# Patient Record
Sex: MALE | Race: WHITE | Employment: FULL TIME | URBAN - METROPOLITAN AREA
[De-identification: names, ages, dates, MRNs, and addresses within clinical notes are randomized per-mention and may not be internally consistent; named-entity substitution may affect disease eponyms.]

---

## 2021-02-22 ENCOUNTER — IMMUNIZATION (OUTPATIENT)
Dept: PRIMARY CARE CLINIC | Age: 66
End: 2021-02-22
Payer: MEDICARE

## 2021-02-22 PROCEDURE — 0001A COVID-19, PFIZER VACCINE 30MCG/0.3ML DOSE: CPT | Performed by: NURSE PRACTITIONER

## 2021-02-22 PROCEDURE — 91300 COVID-19, PFIZER VACCINE 30MCG/0.3ML DOSE: CPT | Performed by: NURSE PRACTITIONER

## 2021-03-16 ENCOUNTER — IMMUNIZATION (OUTPATIENT)
Dept: PRIMARY CARE CLINIC | Age: 66
End: 2021-03-16
Payer: MEDICARE

## 2021-03-16 PROCEDURE — 91300 COVID-19, PFIZER VACCINE 30MCG/0.3ML DOSE: CPT | Performed by: NURSE PRACTITIONER

## 2021-03-16 PROCEDURE — 0002A COVID-19, PFIZER VACCINE 30MCG/0.3ML DOSE: CPT | Performed by: NURSE PRACTITIONER

## 2022-04-20 ENCOUNTER — HOSPITAL ENCOUNTER (EMERGENCY)
Age: 67
Discharge: HOME | End: 2022-04-20
Attending: EMERGENCY MEDICINE
Payer: MEDICARE

## 2022-04-20 ENCOUNTER — APPOINTMENT (OUTPATIENT)
Dept: GENERAL RADIOLOGY | Age: 67
End: 2022-04-20
Payer: MEDICARE

## 2022-04-20 ENCOUNTER — APPOINTMENT (OUTPATIENT)
Dept: CT IMAGING | Age: 67
End: 2022-04-20
Payer: MEDICARE

## 2022-04-20 ENCOUNTER — APPOINTMENT (OUTPATIENT)
Dept: ULTRASOUND IMAGING | Age: 67
End: 2022-04-20
Payer: MEDICARE

## 2022-04-20 VITALS
TEMPERATURE: 97.6 F | HEART RATE: 82 BPM | OXYGEN SATURATION: 97 % | BODY MASS INDEX: 24.11 KG/M2 | HEIGHT: 72 IN | SYSTOLIC BLOOD PRESSURE: 119 MMHG | WEIGHT: 178 LBS | RESPIRATION RATE: 18 BRPM | DIASTOLIC BLOOD PRESSURE: 90 MMHG

## 2022-04-20 DIAGNOSIS — I63.9 CEREBROVASCULAR ACCIDENT (CVA), UNSPECIFIED MECHANISM (HCC): Primary | ICD-10-CM

## 2022-04-20 LAB
ANION GAP SERPL CALCULATED.3IONS-SCNC: 11 MMOL/L (ref 7–16)
BUN BLDV-MCNC: 22 MG/DL (ref 6–23)
CALCIUM SERPL-MCNC: 10.6 MG/DL (ref 8.6–10.2)
CHLORIDE BLD-SCNC: 99 MMOL/L (ref 98–107)
CO2: 24 MMOL/L (ref 22–29)
CREAT SERPL-MCNC: 0.9 MG/DL (ref 0.7–1.2)
GFR AFRICAN AMERICAN: >60
GFR NON-AFRICAN AMERICAN: >60 ML/MIN/1.73
GLUCOSE BLD-MCNC: 244 MG/DL (ref 74–99)
HCT VFR BLD CALC: 49.7 % (ref 37–54)
HEMOGLOBIN: 16.8 G/DL (ref 12.5–16.5)
MCH RBC QN AUTO: 29.3 PG (ref 26–35)
MCHC RBC AUTO-ENTMCNC: 33.8 % (ref 32–34.5)
MCV RBC AUTO: 86.6 FL (ref 80–99.9)
PDW BLD-RTO: 12.6 FL (ref 11.5–15)
PLATELET # BLD: 245 E9/L (ref 130–450)
PMV BLD AUTO: 12 FL (ref 7–12)
POTASSIUM SERPL-SCNC: 4.5 MMOL/L (ref 3.5–5)
RBC # BLD: 5.74 E12/L (ref 3.8–5.8)
SODIUM BLD-SCNC: 134 MMOL/L (ref 132–146)
TROPONIN, HIGH SENSITIVITY: 24 NG/L (ref 0–11)
TROPONIN, HIGH SENSITIVITY: 26 NG/L (ref 0–11)
WBC # BLD: 6.7 E9/L (ref 4.5–11.5)

## 2022-04-20 PROCEDURE — 70450 CT HEAD/BRAIN W/O DYE: CPT

## 2022-04-20 PROCEDURE — 80048 BASIC METABOLIC PNL TOTAL CA: CPT

## 2022-04-20 PROCEDURE — 99285 EMERGENCY DEPT VISIT HI MDM: CPT

## 2022-04-20 PROCEDURE — 71045 X-RAY EXAM CHEST 1 VIEW: CPT

## 2022-04-20 PROCEDURE — 85027 COMPLETE CBC AUTOMATED: CPT

## 2022-04-20 PROCEDURE — 84484 ASSAY OF TROPONIN QUANT: CPT

## 2022-04-20 PROCEDURE — 93005 ELECTROCARDIOGRAM TRACING: CPT | Performed by: EMERGENCY MEDICINE

## 2022-04-20 PROCEDURE — 93880 EXTRACRANIAL BILAT STUDY: CPT

## 2022-04-20 RX ORDER — INSULIN GLARGINE 300 U/ML
45 INJECTION, SOLUTION SUBCUTANEOUS DAILY
COMMUNITY

## 2022-04-20 RX ORDER — LISINOPRIL 10 MG/1
10 TABLET ORAL DAILY
COMMUNITY

## 2022-04-20 RX ORDER — ASPIRIN 81 MG/1
81 TABLET ORAL DAILY
COMMUNITY

## 2022-04-20 RX ORDER — METOPROLOL SUCCINATE 50 MG/1
50 TABLET, EXTENDED RELEASE ORAL DAILY
COMMUNITY

## 2022-04-20 RX ORDER — ATORVASTATIN CALCIUM 80 MG/1
80 TABLET, FILM COATED ORAL DAILY
COMMUNITY

## 2022-04-20 RX ORDER — ERGOCALCIFEROL 1.25 MG/1
50000 CAPSULE ORAL WEEKLY
COMMUNITY

## 2022-04-20 RX ORDER — METFORMIN HYDROCHLORIDE 500 MG/1
500 TABLET, EXTENDED RELEASE ORAL 2 TIMES DAILY
COMMUNITY

## 2022-04-20 RX ORDER — LANOLIN ALCOHOL/MO/W.PET/CERES
1000 CREAM (GRAM) TOPICAL DAILY
COMMUNITY

## 2022-04-20 RX ORDER — SERTRALINE HYDROCHLORIDE 100 MG/1
100 TABLET, FILM COATED ORAL DAILY
COMMUNITY

## 2022-04-20 ASSESSMENT — ENCOUNTER SYMPTOMS
NAUSEA: 0
TROUBLE SWALLOWING: 1
VISUAL CHANGE: 0

## 2022-04-20 NOTE — ED PROVIDER NOTES
Patient presents with stroke symptoms that he awoke with on Monday 4/18/22. He states he has difficulty swallowing, left sided facial droop, slurred speech and imbalance with walking. He had temperature sensation change on the left upper extremity that has since resolved. He states his strength has been intake. He takes a daily aspirin due to cardiac bypass. The history is provided by the patient. Cerebrovascular Accident  Presenting symptoms: language symptoms, loss of balance and sensory loss    Presenting symptoms: no change in level of consciousness, no confusion, no headaches, no disturbances in coordination, no visual change and no weakness    Onset quality:  Unable to specify  Last known well:  4/17/22  Timing:  Constant  Progression:  Improving  Similar to previous episodes: no    Associated symptoms: difficulty swallowing and dizziness    Associated symptoms: no chest pain, no facial pain, no fever, no hearing loss, no nausea, no paresthesias and no vertigo        Review of Systems   Constitutional: Negative for fever. HENT: Positive for trouble swallowing. Negative for hearing loss. Cardiovascular: Negative for chest pain. Gastrointestinal: Negative for nausea. Musculoskeletal: Negative. Skin: Negative. Neurological: Positive for dizziness, facial asymmetry, speech difficulty and loss of balance. Negative for vertigo, weakness, light-headedness, headaches, disturbances in coordination and paresthesias. Psychiatric/Behavioral: Negative for confusion. Physical Exam  Vitals and nursing note reviewed. Constitutional:       General: He is not in acute distress. Appearance: Normal appearance. He is well-developed and normal weight. He is not ill-appearing, toxic-appearing or diaphoretic. HENT:      Head: Normocephalic and atraumatic.       Right Ear: Tympanic membrane, ear canal and external ear normal.      Left Ear: Tympanic membrane, ear canal and external ear normal. Nose: Nose normal.      Mouth/Throat:      Mouth: Mucous membranes are moist.      Pharynx: Oropharynx is clear. Eyes:      Extraocular Movements: Extraocular movements intact. Conjunctiva/sclera: Conjunctivae normal.      Pupils: Pupils are equal, round, and reactive to light. Cardiovascular:      Rate and Rhythm: Normal rate and regular rhythm. Pulses: Normal pulses. Heart sounds: Normal heart sounds. No murmur heard. Pulmonary:      Effort: Pulmonary effort is normal. No respiratory distress. Breath sounds: Normal breath sounds. No wheezing or rales. Abdominal:      General: Bowel sounds are normal.      Palpations: Abdomen is soft. Tenderness: There is no abdominal tenderness. There is no guarding or rebound. Musculoskeletal:         General: No swelling or tenderness. Normal range of motion. Cervical back: Normal range of motion and neck supple. No rigidity or tenderness. Right lower leg: No edema. Left lower leg: No edema. Lymphadenopathy:      Cervical: No cervical adenopathy. Skin:     General: Skin is warm and dry. Capillary Refill: Capillary refill takes less than 2 seconds. Coloration: Skin is not pale. Findings: No erythema or rash. Neurological:      Mental Status: He is alert and oriented to person, place, and time. Cranial Nerves: No cranial nerve deficit. Motor: Weakness (left lower facial) present. Coordination: Coordination normal.      Comments: Last known well time: 4/17/22  NIH Stroke Scale at time of initial evaluation:   1A: Level of Consciousness 0 - alert; keenly responsive  1B: Ask Month and Age 0 - answers both questions correctly  1C:  Tell Patient To Open and Close Eyes, then Hand  Squeeze 0 - performs both tasks correctly  2: Test Horizontal Extraocular Movements 0 - normal  3: Test Visual Fields 0 - no visual loss  4: Test Facial Palsy 2 - partial paralysis (total or near total paralysis of the lower face)  5A: Test Left Arm Motor Drift 0 - no drift, limb holds 90 (or 45) degrees for full 10 seconds  5B: Test Right Arm Motor Drift 0 - no drift, limb holds 90 (or 45) degrees for full 10 seconds  6A: Test Left Leg Motor Drift 0 - no drift; leg holds 30 degree position for full 5 seconds  6B: Test Right Leg Motor Drift 0 - no drift; leg holds 30 degree position for full 5 seconds  7: Test Limb Ataxia   (FNF/Heel-Shin) 0 - absent  8: Test Sensation 0 - normal; no sensory loss  9: Test Language/Aphasia 1 - mild to moderate aphasia; some obvious loss of fluency or facility of comprehension without significant limitation on ideas expressed or form of expression. Reduction of speech and/or comprehension, however, makes conversation about provided materials difficult or impossible. For example, in conversation about provided materials, examiner can identify picture or naming card content from patient's response. 10: Test Dysarthria 0 - normal  11: Test Extinction/Inattention 0 - no abnormality  Total NIH Stroke Score: 4    tPA Criteria*  Inclusion criteria:  - Ischemic stroke onset within 3 hours of drug administration  - Age 25 or older  - No hemorrhage or non-stroke cause of deficit on CT  - Measurable deficit on NIH Stroke Scale    Exclusion criteria: If the patient. ...  - has minor or improving symptoms  - had seizure at onset of stroke  - has had another stroke or serious head trauma within the last 3 months  - has had major surgery within the last 14 days  - has known history of intracranial hemorrhage  - has sustained systolic blood pressure >967 mmHg  - has sustained diastolic blood pressure >074 mmHg  - requires aggressive treatment is necessary to lower their blood pressure  - has symptoms suggestive of subarachnoid hemorrhage  - has had GI or urinary tract hemorrhage within the last 21 days  - has had an arterial puncture at a non-compressible site within the last 7 days   - received heparin within the last 48 hours and has an elevated PTT  - has a prothrombin time (PT) >15 seconds  - has a platelet count <668,754 uL  - serum blood glucose is <50 mg/dL or >400 mg/dL    Relative Contraindications:  - NIH Stroke score >22  - Patient's CT shows evidence of large MCA territory infarction (>1/3 the MCA territory)    *[facility] Policy Paper      Acute CVA Core Measures:     - t-PA Eligibility: IV t-PA was considered and not given due to violations in inclusion criteria including stroke onset was greater than 3 hours prior to presentation        Psychiatric:         Mood and Affect: Mood normal.         Behavior: Behavior normal.         Thought Content: Thought content normal.         Judgment: Judgment normal.         Procedures    MDM       EKG Interpretation    Interpreted by emergency department physician    Rhythm: normal sinus   Rate: normal  Axis: normal  Ectopy: none  Conduction: normal  ST Segments: nonspecific changes  T Waves: inversion in  v5 and III  Q Waves: nonspecific    Clinical Impression: non-specific EKG    Cecilia Bliss DO       --------------------------------------------- PAST HISTORY ---------------------------------------------  Past Medical History:  has no past medical history on file. Past Surgical History:  has no past surgical history on file. Social History:      Family History: family history is not on file. The patients home medications have been reviewed. Allergies: Patient has no known allergies.     -------------------------------------------------- RESULTS -------------------------------------------------  Labs:  Results for orders placed or performed during the hospital encounter of 51/74/76   Basic metabolic panel   Result Value Ref Range    Sodium 134 132 - 146 mmol/L    Potassium 4.5 3.5 - 5.0 mmol/L    Chloride 99 98 - 107 mmol/L    CO2 24 22 - 29 mmol/L    Anion Gap 11 7 - 16 mmol/L    Glucose 244 (H) 74 - 99 mg/dL    BUN 22 6 - 23 mg/dL CREATININE 0.9 0.7 - 1.2 mg/dL    GFR Non-African American >60 >=60 mL/min/1.73    GFR African American >60     Calcium 10.6 (H) 8.6 - 10.2 mg/dL   CBC   Result Value Ref Range    WBC 6.7 4.5 - 11.5 E9/L    RBC 5.74 3.80 - 5.80 E12/L    Hemoglobin 16.8 (H) 12.5 - 16.5 g/dL    Hematocrit 49.7 37.0 - 54.0 %    MCV 86.6 80.0 - 99.9 fL    MCH 29.3 26.0 - 35.0 pg    MCHC 33.8 32.0 - 34.5 %    RDW 12.6 11.5 - 15.0 fL    Platelets 885 452 - 130 E9/L    MPV 12.0 7.0 - 12.0 fL   Troponin   Result Value Ref Range    Troponin, High Sensitivity 26 (H) 0 - 11 ng/L   Troponin   Result Value Ref Range    Troponin, High Sensitivity 24 (H) 0 - 11 ng/L   EKG 12 Lead   Result Value Ref Range    Ventricular Rate 86 BPM    Atrial Rate 86 BPM    P-R Interval 156 ms    QRS Duration 86 ms    Q-T Interval 324 ms    QTc Calculation (Bazett) 387 ms    P Axis 25 degrees    R Axis -4 degrees    T Axis -17 degrees       Radiology:  US CAROTID ARTERY BILATERAL   Final Result   The right internal carotid artery demonstrates 0-50% stenosis. The left internal carotid artery demonstrates 0-50% stenosis. Bilateral vertebral arteries are patent with flow in the normal direction. XR CHEST 1 VIEW   Final Result   Minimal scarring and or atelectasis on the left. Postoperative changes. CT HEAD WO CONTRAST   Final Result   No acute intracranial abnormality.             ------------------------- NURSING NOTES AND VITALS REVIEWED ---------------------------  Date / Time Roomed:  4/20/2022  8:37 AM  ED Bed Assignment:  03/03    The nursing notes within the ED encounter and vital signs as below have been reviewed.    /77   Pulse 92   Temp 97.6 °F (36.4 °C) (Infrared)   Resp 16   Ht 6' (1.829 m)   Wt 178 lb (80.7 kg)   SpO2 96%   BMI 24.14 kg/m²   Oxygen Saturation Interpretation: Normal      ------------------------------------------ PROGRESS NOTES ------------------------------------------  I have spoken with the patient and discussed todays results, in addition to providing specific details for the plan of care and counseling regarding the diagnosis and prognosis. Their questions are answered at this time and they are agreeable with the plan. I discussed at length with them reasons for immediate return here for re evaluation. They will followup with primary care by calling their office tomorrow. Patient is to continue taking aspirin daily and will be started on Plavix for the next month. A prescription was provided. His workup here does not require urgent admission. It has been 3 days since his symptom onset and his symptoms have been improving. He understands that he needs to promptly return if his symptoms worsen. He will call PCP to make an outpatient appointment. I too have placed a call to Dr Edgar Rodarte. I spoke to the admitting physician who agrees with the plan for further outpatient follow up.  --------------------------------- ADDITIONAL PROVIDER NOTES ---------------------------------  At this time the patient is without objective evidence of an acute process requiring hospitalization or inpatient management. They have remained hemodynamically stable throughout their entire ED visit and are stable for discharge with outpatient follow-up. The plan has been discussed in detail and they are aware of the specific conditions for emergent return, as well as the importance of follow-up. New Prescriptions    No medications on file       Diagnosis:  1. Cerebrovascular accident (CVA), unspecified mechanism ([facility]        Disposition:  Patient's disposition: Discharge to home  Patient's condition is stable.          [address], DO  04/20/22 1403

## 2022-04-23 LAB
EKG ATRIAL RATE: 86 BPM
EKG P AXIS: 25 DEGREES
EKG P-R INTERVAL: 156 MS
EKG Q-T INTERVAL: 324 MS
EKG QRS DURATION: 86 MS
EKG QTC CALCULATION (BAZETT): 387 MS
EKG R AXIS: -4 DEGREES
EKG T AXIS: -17 DEGREES
EKG VENTRICULAR RATE: 86 BPM

## 2022-12-26 ENCOUNTER — APPOINTMENT (OUTPATIENT)
Dept: ULTRASOUND IMAGING | Age: 67
DRG: 066 | End: 2022-12-26
Payer: MEDICARE

## 2022-12-26 ENCOUNTER — HOSPITAL ENCOUNTER (INPATIENT)
Age: 67
LOS: 3 days | Discharge: NURSING, REHAB OR LONG-TERM CARE | DRG: 066 | End: 2022-12-29
Attending: EMERGENCY MEDICINE | Admitting: INTERNAL MEDICINE
Payer: MEDICARE

## 2022-12-26 ENCOUNTER — APPOINTMENT (OUTPATIENT)
Dept: CT IMAGING | Age: 67
DRG: 066 | End: 2022-12-26
Payer: MEDICARE

## 2022-12-26 DIAGNOSIS — I63.9 CEREBROVASCULAR ACCIDENT (CVA), UNSPECIFIED MECHANISM (HCC): Primary | ICD-10-CM

## 2022-12-26 LAB
ANION GAP SERPL CALCULATED.3IONS-SCNC: 14 MMOL/L (ref 7–16)
APTT: 28.6 SEC (ref 24.5–35.1)
BACTERIA: ABNORMAL /HPF
BASOPHILS ABSOLUTE: 0.05 E9/L (ref 0–0.2)
BASOPHILS RELATIVE PERCENT: 0.4 % (ref 0–2)
BILIRUBIN URINE: NEGATIVE
BLOOD, URINE: NEGATIVE
BUN BLDV-MCNC: 17 MG/DL (ref 6–23)
CALCIUM SERPL-MCNC: 10.5 MG/DL (ref 8.6–10.2)
CHLORIDE BLD-SCNC: 101 MMOL/L (ref 98–107)
CHP ED QC CHECK: NORMAL
CLARITY: CLEAR
CO2: 22 MMOL/L (ref 22–29)
COLOR: YELLOW
CREAT SERPL-MCNC: 0.9 MG/DL (ref 0.7–1.2)
EOSINOPHILS ABSOLUTE: 0.24 E9/L (ref 0.05–0.5)
EOSINOPHILS RELATIVE PERCENT: 2.1 % (ref 0–6)
GFR SERPL CREATININE-BSD FRML MDRD: >60 ML/MIN/1.73
GLUCOSE BLD-MCNC: 225 MG/DL
GLUCOSE BLD-MCNC: 225 MG/DL (ref 74–99)
GLUCOSE URINE: >=1000 MG/DL
HCT VFR BLD CALC: 46.7 % (ref 37–54)
HEMOGLOBIN: 15.9 G/DL (ref 12.5–16.5)
IMMATURE GRANULOCYTES #: 0.04 E9/L
IMMATURE GRANULOCYTES %: 0.3 % (ref 0–5)
INFLUENZA A BY PCR: NOT DETECTED
INFLUENZA B BY PCR: NOT DETECTED
INR BLD: 1
KETONES, URINE: 15 MG/DL
LEUKOCYTE ESTERASE, URINE: NEGATIVE
LYMPHOCYTES ABSOLUTE: 2.89 E9/L (ref 1.5–4)
LYMPHOCYTES RELATIVE PERCENT: 25.1 % (ref 20–42)
MCH RBC QN AUTO: 29 PG (ref 26–35)
MCHC RBC AUTO-ENTMCNC: 34 % (ref 32–34.5)
MCV RBC AUTO: 85.2 FL (ref 80–99.9)
METER GLUCOSE: 225 MG/DL (ref 74–99)
MONOCYTES ABSOLUTE: 0.81 E9/L (ref 0.1–0.95)
MONOCYTES RELATIVE PERCENT: 7 % (ref 2–12)
NEUTROPHILS ABSOLUTE: 7.5 E9/L (ref 1.8–7.3)
NEUTROPHILS RELATIVE PERCENT: 65.1 % (ref 43–80)
NITRITE, URINE: NEGATIVE
PDW BLD-RTO: 12.4 FL (ref 11.5–15)
PH UA: 5.5 (ref 5–9)
PLATELET # BLD: 224 E9/L (ref 130–450)
PMV BLD AUTO: 11.3 FL (ref 7–12)
POTASSIUM SERPL-SCNC: 4 MMOL/L (ref 3.5–5)
PROTEIN UA: NEGATIVE MG/DL
PROTHROMBIN TIME: 11 SEC (ref 9.3–12.4)
RBC # BLD: 5.48 E12/L (ref 3.8–5.8)
RBC UA: ABNORMAL /HPF (ref 0–2)
REASON FOR REJECTION: NORMAL
REJECTED TEST: NORMAL
SARS-COV-2, NAAT: NOT DETECTED
SODIUM BLD-SCNC: 137 MMOL/L (ref 132–146)
SPECIFIC GRAVITY UA: 1.02 (ref 1–1.03)
TROPONIN, HIGH SENSITIVITY: 43 NG/L (ref 0–11)
TROPONIN, HIGH SENSITIVITY: 45 NG/L (ref 0–11)
UROBILINOGEN, URINE: 0.2 E.U./DL
WBC # BLD: 11.5 E9/L (ref 4.5–11.5)
WBC UA: ABNORMAL /HPF (ref 0–5)

## 2022-12-26 PROCEDURE — 93880 EXTRACRANIAL BILAT STUDY: CPT

## 2022-12-26 PROCEDURE — 84484 ASSAY OF TROPONIN QUANT: CPT

## 2022-12-26 PROCEDURE — 70450 CT HEAD/BRAIN W/O DYE: CPT

## 2022-12-26 PROCEDURE — 1200000000 HC SEMI PRIVATE

## 2022-12-26 PROCEDURE — 93005 ELECTROCARDIOGRAM TRACING: CPT

## 2022-12-26 PROCEDURE — 82962 GLUCOSE BLOOD TEST: CPT

## 2022-12-26 PROCEDURE — 87502 INFLUENZA DNA AMP PROBE: CPT

## 2022-12-26 PROCEDURE — 85610 PROTHROMBIN TIME: CPT

## 2022-12-26 PROCEDURE — 80048 BASIC METABOLIC PNL TOTAL CA: CPT

## 2022-12-26 PROCEDURE — 85730 THROMBOPLASTIN TIME PARTIAL: CPT

## 2022-12-26 PROCEDURE — 85025 COMPLETE CBC W/AUTO DIFF WBC: CPT

## 2022-12-26 PROCEDURE — 87635 SARS-COV-2 COVID-19 AMP PRB: CPT

## 2022-12-26 PROCEDURE — 99285 EMERGENCY DEPT VISIT HI MDM: CPT

## 2022-12-26 PROCEDURE — 81001 URINALYSIS AUTO W/SCOPE: CPT

## 2022-12-26 PROCEDURE — 36415 COLL VENOUS BLD VENIPUNCTURE: CPT

## 2022-12-26 RX ORDER — LISINOPRIL 10 MG/1
10 TABLET ORAL DAILY
Status: DISCONTINUED | OUTPATIENT
Start: 2022-12-27 | End: 2022-12-27

## 2022-12-26 RX ORDER — POLYETHYLENE GLYCOL 3350 17 G/17G
17 POWDER, FOR SOLUTION ORAL DAILY PRN
Status: DISCONTINUED | OUTPATIENT
Start: 2022-12-26 | End: 2022-12-29 | Stop reason: HOSPADM

## 2022-12-26 RX ORDER — DEXTROSE MONOHYDRATE 100 MG/ML
INJECTION, SOLUTION INTRAVENOUS CONTINUOUS PRN
Status: DISCONTINUED | OUTPATIENT
Start: 2022-12-26 | End: 2022-12-29 | Stop reason: HOSPADM

## 2022-12-26 RX ORDER — CLOPIDOGREL BISULFATE 75 MG/1
75 TABLET ORAL DAILY
Status: DISCONTINUED | OUTPATIENT
Start: 2022-12-27 | End: 2022-12-29 | Stop reason: HOSPADM

## 2022-12-26 RX ORDER — INSULIN GLARGINE 100 [IU]/ML
45 INJECTION, SOLUTION SUBCUTANEOUS DAILY
Status: DISCONTINUED | OUTPATIENT
Start: 2022-12-27 | End: 2022-12-27

## 2022-12-26 RX ORDER — POTASSIUM CHLORIDE 20 MEQ/1
40 TABLET, EXTENDED RELEASE ORAL PRN
Status: DISCONTINUED | OUTPATIENT
Start: 2022-12-26 | End: 2022-12-29 | Stop reason: HOSPADM

## 2022-12-26 RX ORDER — INSULIN LISPRO 100 [IU]/ML
0-8 INJECTION, SOLUTION INTRAVENOUS; SUBCUTANEOUS
Status: DISCONTINUED | OUTPATIENT
Start: 2022-12-27 | End: 2022-12-29 | Stop reason: HOSPADM

## 2022-12-26 RX ORDER — ASPIRIN 81 MG/1
81 TABLET ORAL DAILY
Status: DISCONTINUED | OUTPATIENT
Start: 2022-12-27 | End: 2022-12-29 | Stop reason: HOSPADM

## 2022-12-26 RX ORDER — METOPROLOL SUCCINATE 50 MG/1
50 TABLET, EXTENDED RELEASE ORAL DAILY
Status: DISCONTINUED | OUTPATIENT
Start: 2022-12-27 | End: 2022-12-27

## 2022-12-26 RX ORDER — ONDANSETRON 2 MG/ML
4 INJECTION INTRAMUSCULAR; INTRAVENOUS EVERY 6 HOURS PRN
Status: DISCONTINUED | OUTPATIENT
Start: 2022-12-26 | End: 2022-12-29 | Stop reason: HOSPADM

## 2022-12-26 RX ORDER — LANOLIN ALCOHOL/MO/W.PET/CERES
1000 CREAM (GRAM) TOPICAL DAILY
Status: DISCONTINUED | OUTPATIENT
Start: 2022-12-27 | End: 2022-12-29 | Stop reason: HOSPADM

## 2022-12-26 RX ORDER — ONDANSETRON 4 MG/1
4 TABLET, ORALLY DISINTEGRATING ORAL EVERY 8 HOURS PRN
Status: DISCONTINUED | OUTPATIENT
Start: 2022-12-26 | End: 2022-12-29 | Stop reason: HOSPADM

## 2022-12-26 RX ORDER — INSULIN LISPRO 100 [IU]/ML
0-4 INJECTION, SOLUTION INTRAVENOUS; SUBCUTANEOUS NIGHTLY
Status: DISCONTINUED | OUTPATIENT
Start: 2022-12-26 | End: 2022-12-29 | Stop reason: HOSPADM

## 2022-12-26 RX ORDER — ATORVASTATIN CALCIUM 40 MG/1
80 TABLET, FILM COATED ORAL DAILY
Status: DISCONTINUED | OUTPATIENT
Start: 2022-12-27 | End: 2022-12-29 | Stop reason: HOSPADM

## 2022-12-26 RX ORDER — ASPIRIN 325 MG
325 TABLET ORAL ONCE
Status: COMPLETED | OUTPATIENT
Start: 2022-12-26 | End: 2022-12-27

## 2022-12-26 RX ORDER — CLOPIDOGREL BISULFATE 75 MG/1
75 TABLET ORAL DAILY
Status: DISCONTINUED | OUTPATIENT
Start: 2022-12-27 | End: 2022-12-26

## 2022-12-26 RX ORDER — MAGNESIUM SULFATE 1 G/100ML
1000 INJECTION INTRAVENOUS PRN
Status: DISCONTINUED | OUTPATIENT
Start: 2022-12-26 | End: 2022-12-29 | Stop reason: HOSPADM

## 2022-12-26 RX ORDER — LABETALOL HYDROCHLORIDE 5 MG/ML
10 INJECTION, SOLUTION INTRAVENOUS EVERY 10 MIN PRN
Status: DISCONTINUED | OUTPATIENT
Start: 2022-12-26 | End: 2022-12-29 | Stop reason: HOSPADM

## 2022-12-26 RX ORDER — CLOPIDOGREL BISULFATE 75 MG/1
75 TABLET ORAL ONCE
Status: COMPLETED | OUTPATIENT
Start: 2022-12-26 | End: 2022-12-27

## 2022-12-26 RX ORDER — ERGOCALCIFEROL 1.25 MG/1
50000 CAPSULE ORAL WEEKLY
Status: DISCONTINUED | OUTPATIENT
Start: 2022-12-26 | End: 2022-12-27 | Stop reason: DRUGHIGH

## 2022-12-26 RX ORDER — ENOXAPARIN SODIUM 100 MG/ML
40 INJECTION SUBCUTANEOUS DAILY
Status: DISCONTINUED | OUTPATIENT
Start: 2022-12-27 | End: 2022-12-29 | Stop reason: HOSPADM

## 2022-12-26 RX ORDER — POTASSIUM CHLORIDE 7.45 MG/ML
10 INJECTION INTRAVENOUS PRN
Status: DISCONTINUED | OUTPATIENT
Start: 2022-12-26 | End: 2022-12-29 | Stop reason: HOSPADM

## 2022-12-26 ASSESSMENT — ENCOUNTER SYMPTOMS
VOMITING: 0
PHOTOPHOBIA: 0
EYE PAIN: 1
FACIAL SWELLING: 0
SHORTNESS OF BREATH: 0
CHOKING: 0
ABDOMINAL PAIN: 0
BACK PAIN: 0
DIARRHEA: 0
CONSTIPATION: 0
COUGH: 0
SORE THROAT: 0
NAUSEA: 0

## 2022-12-26 NOTE — LETTER
[place] Department Medicaid  CERTIFICATION OF NECESSITY  FOR NON-EMERGENCY TRANSPORTATION   BY GROUND AMBULANCE      Individual Information   1. Name: Luis Carlos Pablo 2. [place] Medicaid Billing Number:    3. Address: [place] [address]      Transportation Provider Information   4. Provider Name: Ping Barrera   5. [place] Medicaid Provider Number: National Provider Identifier (NPI):      Certification  7. Criteria:  During transport, this individual requires:  [] Medical treatment or continuous     supervision by an EMT. [] The administration or regulation of oxygen by another person. [] Supervised protective restraint. 8. Period Beginning Date: 12/29/2022.   9. Length  [x] Not more than 1 Day(s)  [] One Year     Additional Information Relevant to Certification   10. Comments or Explanations, If Necessary or Appropriate  STROKE LEFT POSTERIOR LATERAL KENYA, SUPERIMPOSED ON CEREBROVASCULAR DISEASE WITH PRIOR CVA, FALLS AND BALANCE DEFICITS      Certifying Practitioner Information   11. Name of Practitioner: DR Basim Callahan   12. [place] Medicaid Provider Number, If Applicable:  [address] Provider Identifier (NPI):      Signature Information   14. Date of Signature: 12/29/2022. 15. Name of Person Signing: KINJAL Caputo.12/29/2022.1:30 PM.    16. Signature and Professional Designation: Rachel Caputo. 12/29/2022.1:30 PM.      OD 87189  Rev. 7/2015  [facility] Encounter Date/Time: 12/26/2022 [address] [place] [address]  Account: [de-identified]    MRN: [id number]    Patient: Colonel Hartman Serial #: [id number]      ENCOUNTER          Patient Class: I Private Enc?   No Unit RM BD: [facility] 4 TELE 0421/0421-01   Hospital Service: TEL   Encounter DX: Stroke determined by cli*   ADM Provider: Nancy Choudhary DO   Procedure:     ATT Provider: Nancy Choudhary DO   REF Provider:        Admission DX: Stroke determined by clinical assessment [facility]), Cerebrovascular accident (CVA), unspecified mechanism ([facility] and DX codes: I63.9, I63.9      PATIENT                 Name: Fracisco Roth : 1955 (67 yrs)   Address: [address] [place] [address] Sex: Male   [facility] [address]         Marital Status:    Employer: [employer]         Mosque: None   Primary Care Provider: Neno Felder MD         Primary Phone: [phone]   EMERGENCY CONTACT   Contact Name Legal Guardian? Relationship to Patient Home Phone Work Phone   1. lennox marquez  2. *No Contact Specified* No    Child    [phone]                 GUARANTOR            Guarantor: Fracisco Roth     : 1955   Address: [address] [place] [address] Sex: Male     [address] [facility] [address]     Relation to Patient: Self       Home Phone: [phone]   Guarantor ID: [id number]       Work Phone: [phone]   Guarantor Employer: [employer]         Status: FULL TIME      COVERAGE  PRIMARY INSURANCE   Payor: [facility] MEDICARE Plan: [facility]*   Payor Address: [facility] [id number] [place] [address]       Group Number: [id number] Type: INDEMNITY   Subscriber Name: James Baig : 1955   Subscriber ID: [id number] Ector Blazing. Rel. to Sub: Self   SECONDARY INSURANCE   Payor:   Plan:     Payor Address:  ,           Group Number:   Insurance Type:     Subscriber Name:   Subscriber :     Subscriber ID:   Pat.  Rel. to Sub:           CSN: [id number]

## 2022-12-27 ENCOUNTER — APPOINTMENT (OUTPATIENT)
Dept: MRI IMAGING | Age: 67
DRG: 066 | End: 2022-12-27
Payer: MEDICARE

## 2022-12-27 PROBLEM — I63.9 ACUTE CVA (CEREBROVASCULAR ACCIDENT) (HCC): Status: ACTIVE | Noted: 2022-12-27

## 2022-12-27 LAB
ALBUMIN SERPL-MCNC: 4 G/DL (ref 3.5–5.2)
ALP BLD-CCNC: 99 U/L (ref 40–129)
ALT SERPL-CCNC: 21 U/L (ref 0–40)
ANION GAP SERPL CALCULATED.3IONS-SCNC: 13 MMOL/L (ref 7–16)
AST SERPL-CCNC: 16 U/L (ref 0–39)
BASOPHILS ABSOLUTE: 0.06 E9/L (ref 0–0.2)
BASOPHILS RELATIVE PERCENT: 0.6 % (ref 0–2)
BILIRUB SERPL-MCNC: 1.2 MG/DL (ref 0–1.2)
BILIRUBIN DIRECT: 0.2 MG/DL (ref 0–0.3)
BILIRUBIN, INDIRECT: 1 MG/DL (ref 0–1)
BUN BLDV-MCNC: 20 MG/DL (ref 6–23)
CALCIUM SERPL-MCNC: 10.3 MG/DL (ref 8.6–10.2)
CHLORIDE BLD-SCNC: 103 MMOL/L (ref 98–107)
CHOLESTEROL, TOTAL: 256 MG/DL (ref 0–199)
CO2: 21 MMOL/L (ref 22–29)
CREAT SERPL-MCNC: 0.9 MG/DL (ref 0.7–1.2)
EOSINOPHILS ABSOLUTE: 0.28 E9/L (ref 0.05–0.5)
EOSINOPHILS RELATIVE PERCENT: 3 % (ref 0–6)
GFR SERPL CREATININE-BSD FRML MDRD: >60 ML/MIN/1.73
GLUCOSE BLD-MCNC: 230 MG/DL (ref 74–99)
HCT VFR BLD CALC: 48.7 % (ref 37–54)
HDLC SERPL-MCNC: 43 MG/DL
HEMOGLOBIN: 16.2 G/DL (ref 12.5–16.5)
IMMATURE GRANULOCYTES #: 0.04 E9/L
IMMATURE GRANULOCYTES %: 0.4 % (ref 0–5)
LDL CHOLESTEROL CALCULATED: 167 MG/DL (ref 0–99)
LYMPHOCYTES ABSOLUTE: 2.76 E9/L (ref 1.5–4)
LYMPHOCYTES RELATIVE PERCENT: 29.7 % (ref 20–42)
MAGNESIUM: 2.2 MG/DL (ref 1.6–2.6)
MCH RBC QN AUTO: 28.7 PG (ref 26–35)
MCHC RBC AUTO-ENTMCNC: 33.3 % (ref 32–34.5)
MCV RBC AUTO: 86.3 FL (ref 80–99.9)
METER GLUCOSE: 181 MG/DL (ref 74–99)
METER GLUCOSE: 206 MG/DL (ref 74–99)
METER GLUCOSE: 235 MG/DL (ref 74–99)
METER GLUCOSE: 238 MG/DL (ref 74–99)
METER GLUCOSE: 261 MG/DL (ref 74–99)
MONOCYTES ABSOLUTE: 0.68 E9/L (ref 0.1–0.95)
MONOCYTES RELATIVE PERCENT: 7.3 % (ref 2–12)
NEUTROPHILS ABSOLUTE: 5.46 E9/L (ref 1.8–7.3)
NEUTROPHILS RELATIVE PERCENT: 59 % (ref 43–80)
PDW BLD-RTO: 12.8 FL (ref 11.5–15)
PHOSPHORUS: 2.9 MG/DL (ref 2.5–4.5)
PLATELET # BLD: 224 E9/L (ref 130–450)
PMV BLD AUTO: 11.6 FL (ref 7–12)
POTASSIUM SERPL-SCNC: 4.4 MMOL/L (ref 3.5–5)
RBC # BLD: 5.64 E12/L (ref 3.8–5.8)
SODIUM BLD-SCNC: 137 MMOL/L (ref 132–146)
T4 FREE: 1.09 NG/DL (ref 0.93–1.7)
TOTAL PROTEIN: 6.6 G/DL (ref 6.4–8.3)
TRIGL SERPL-MCNC: 231 MG/DL (ref 0–149)
TROPONIN, HIGH SENSITIVITY: 42 NG/L (ref 0–11)
TSH SERPL DL<=0.05 MIU/L-ACNC: 1.9 UIU/ML (ref 0.27–4.2)
VLDLC SERPL CALC-MCNC: 46 MG/DL
WBC # BLD: 9.3 E9/L (ref 4.5–11.5)

## 2022-12-27 PROCEDURE — 6360000004 HC RX CONTRAST MEDICATION: Performed by: RADIOLOGY

## 2022-12-27 PROCEDURE — 1200000000 HC SEMI PRIVATE

## 2022-12-27 PROCEDURE — 82962 GLUCOSE BLOOD TEST: CPT

## 2022-12-27 PROCEDURE — 6370000000 HC RX 637 (ALT 250 FOR IP): Performed by: NURSE PRACTITIONER

## 2022-12-27 PROCEDURE — 97116 GAIT TRAINING THERAPY: CPT | Performed by: PHYSICAL THERAPIST

## 2022-12-27 PROCEDURE — C8929 TTE W OR WO FOL WCON,DOPPLER: HCPCS

## 2022-12-27 PROCEDURE — 83735 ASSAY OF MAGNESIUM: CPT

## 2022-12-27 PROCEDURE — 80048 BASIC METABOLIC PNL TOTAL CA: CPT

## 2022-12-27 PROCEDURE — 84100 ASSAY OF PHOSPHORUS: CPT

## 2022-12-27 PROCEDURE — 84443 ASSAY THYROID STIM HORMONE: CPT

## 2022-12-27 PROCEDURE — 94150 VITAL CAPACITY TEST: CPT

## 2022-12-27 PROCEDURE — A9577 INJ MULTIHANCE: HCPCS | Performed by: RADIOLOGY

## 2022-12-27 PROCEDURE — 6370000000 HC RX 637 (ALT 250 FOR IP): Performed by: EMERGENCY MEDICINE

## 2022-12-27 PROCEDURE — 84484 ASSAY OF TROPONIN QUANT: CPT

## 2022-12-27 PROCEDURE — 6360000002 HC RX W HCPCS: Performed by: INTERNAL MEDICINE

## 2022-12-27 PROCEDURE — 6360000004 HC RX CONTRAST MEDICATION: Performed by: INTERNAL MEDICINE

## 2022-12-27 PROCEDURE — 97161 PT EVAL LOW COMPLEX 20 MIN: CPT | Performed by: PHYSICAL THERAPIST

## 2022-12-27 PROCEDURE — 36415 COLL VENOUS BLD VENIPUNCTURE: CPT

## 2022-12-27 PROCEDURE — 6370000000 HC RX 637 (ALT 250 FOR IP): Performed by: INTERNAL MEDICINE

## 2022-12-27 PROCEDURE — 80061 LIPID PANEL: CPT

## 2022-12-27 PROCEDURE — 92526 ORAL FUNCTION THERAPY: CPT

## 2022-12-27 PROCEDURE — 70553 MRI BRAIN STEM W/O & W/DYE: CPT

## 2022-12-27 PROCEDURE — 80076 HEPATIC FUNCTION PANEL: CPT

## 2022-12-27 PROCEDURE — 85025 COMPLETE CBC W/AUTO DIFF WBC: CPT

## 2022-12-27 PROCEDURE — 92523 SPEECH SOUND LANG COMPREHEN: CPT

## 2022-12-27 PROCEDURE — 92610 EVALUATE SWALLOWING FUNCTION: CPT

## 2022-12-27 PROCEDURE — 84439 ASSAY OF FREE THYROXINE: CPT

## 2022-12-27 RX ORDER — INSULIN GLARGINE 100 [IU]/ML
20 INJECTION, SOLUTION SUBCUTANEOUS NIGHTLY
Status: DISCONTINUED | OUTPATIENT
Start: 2022-12-27 | End: 2022-12-29 | Stop reason: HOSPADM

## 2022-12-27 RX ORDER — ACETAMINOPHEN 160 MG
2000 TABLET,DISINTEGRATING ORAL DAILY
COMMUNITY

## 2022-12-27 RX ORDER — INSULIN GLARGINE 100 [IU]/ML
40 INJECTION, SOLUTION SUBCUTANEOUS DAILY
Status: DISCONTINUED | OUTPATIENT
Start: 2022-12-27 | End: 2022-12-29 | Stop reason: HOSPADM

## 2022-12-27 RX ORDER — CIPROFLOXACIN 250 MG/1
250 TABLET, FILM COATED ORAL 2 TIMES DAILY
Status: ON HOLD | COMMUNITY
End: 2022-12-28 | Stop reason: HOSPADM

## 2022-12-27 RX ORDER — CLOPIDOGREL BISULFATE 75 MG/1
75 TABLET ORAL DAILY
COMMUNITY

## 2022-12-27 RX ORDER — LISINOPRIL 20 MG/1
20 TABLET ORAL DAILY
Status: DISCONTINUED | OUTPATIENT
Start: 2022-12-27 | End: 2022-12-29 | Stop reason: HOSPADM

## 2022-12-27 RX ORDER — EZETIMIBE 10 MG/1
10 TABLET ORAL DAILY
COMMUNITY

## 2022-12-27 RX ORDER — VITAMIN B COMPLEX
2000 TABLET ORAL DAILY
Status: DISCONTINUED | OUTPATIENT
Start: 2022-12-27 | End: 2022-12-29 | Stop reason: HOSPADM

## 2022-12-27 RX ORDER — REPAGLINIDE 2 MG/1
2 TABLET ORAL
COMMUNITY

## 2022-12-27 RX ADMIN — INSULIN LISPRO 2 UNITS: 100 INJECTION, SOLUTION INTRAVENOUS; SUBCUTANEOUS at 16:37

## 2022-12-27 RX ADMIN — GADOBENATE DIMEGLUMINE 18 ML: 529 INJECTION, SOLUTION INTRAVENOUS at 07:09

## 2022-12-27 RX ADMIN — ASPIRIN 81 MG: 81 TABLET, COATED ORAL at 10:30

## 2022-12-27 RX ADMIN — ASPIRIN 325 MG: 325 TABLET ORAL at 00:01

## 2022-12-27 RX ADMIN — CYANOCOBALAMIN TAB 1000 MCG 1000 MCG: 1000 TAB at 10:31

## 2022-12-27 RX ADMIN — LISINOPRIL 20 MG: 20 TABLET ORAL at 10:31

## 2022-12-27 RX ADMIN — PERFLUTREN 1.5 ML: 6.52 INJECTION, SUSPENSION INTRAVENOUS at 11:52

## 2022-12-27 RX ADMIN — ENOXAPARIN SODIUM 40 MG: 100 INJECTION SUBCUTANEOUS at 10:30

## 2022-12-27 RX ADMIN — ATORVASTATIN CALCIUM 80 MG: 40 TABLET, FILM COATED ORAL at 10:12

## 2022-12-27 RX ADMIN — CLOPIDOGREL BISULFATE 75 MG: 75 TABLET ORAL at 00:01

## 2022-12-27 RX ADMIN — CLOPIDOGREL BISULFATE 75 MG: 75 TABLET ORAL at 10:31

## 2022-12-27 RX ADMIN — METOPROLOL SUCCINATE 75 MG: 50 TABLET, EXTENDED RELEASE ORAL at 10:30

## 2022-12-27 RX ADMIN — INSULIN GLARGINE 40 UNITS: 100 INJECTION, SOLUTION SUBCUTANEOUS at 13:20

## 2022-12-27 RX ADMIN — Medication 2000 UNITS: at 10:32

## 2022-12-27 RX ADMIN — INSULIN LISPRO 4 UNITS: 100 INJECTION, SOLUTION INTRAVENOUS; SUBCUTANEOUS at 13:20

## 2022-12-27 NOTE — ED PROVIDER NOTES
[facility]  Department of Emergency Medicine     Written by: Nas Day MD  Patient Name: Natalya Webb  Attending Provider: No att. providers found  Admit Date: 2022  7:07 PM  MRN: [id number]                   : 1955        Chief Complaint   Patient presents with    Numbness     L side numbness, unable to ambulate, difficulty swallowing, LKW , hx of previous stroke     - Chief complaint    59-year-old male that presents to the ED with complaints of left-sided headache and left eye pain that started about 30 hours ago and resolved after 6 hours, and bilateral knee weakness and gait unsteadiness for the past 12 hours approximately. He states that he has a history of prior stroke in May, which is 7 months ago, which left residual numbness of the bilateral face, right upper extremity, right lower extremity. He states that he reveals no new numbness or weakness. He states that admission started yesterday, he was pushing an old lady in the snow. He took 3 aspirins after, and symptoms resolved after 6 hours. He denies chest pain, shortness of breath, fevers, chills, diaphoresis. He does mention that he had a sore throat for the past 7 days with excessive mucus. He states his symptoms were were gradual onset, worsening nature, nothing makes it better or worse, quality symptoms is numbness and headache which resolved, radiation localized to the left head and bilateral knees, severity according to the patient moderately severe, timing is constant. Review of Systems   Constitutional:  Negative for appetite change, chills, diaphoresis and fever. HENT:  Negative for ear discharge, ear pain, facial swelling, sneezing and sore throat. Eyes:  Positive for pain. Negative for photophobia. Respiratory:  Negative for cough, choking and shortness of breath. Cardiovascular:  Negative for chest pain and palpitations.    Gastrointestinal:  Negative for abdominal pain, constipation, diarrhea, nausea and vomiting. Genitourinary:  Negative for dysuria, enuresis, flank pain, frequency and hematuria. Musculoskeletal:  Negative for arthralgias, back pain, joint swelling, myalgias and neck pain. Skin:  Negative for pallor and rash. Neurological:  Positive for numbness and headaches. Negative for weakness and light-headedness. Physical Exam  Constitutional:       General: He is not in acute distress. Appearance: Normal appearance. He is not ill-appearing. HENT:      Head: Normocephalic and atraumatic. Nose: Nose normal. No congestion. Mouth/Throat:      Mouth: Mucous membranes are moist.      Pharynx: No posterior oropharyngeal erythema. Eyes:      General: No scleral icterus. Extraocular Movements: Extraocular movements intact. Pupils: Pupils are equal, round, and reactive to light. Cardiovascular:      Rate and Rhythm: Normal rate and regular rhythm. Pulses: Normal pulses. Heart sounds: Normal heart sounds. Pulmonary:      Effort: Pulmonary effort is normal. No respiratory distress. Breath sounds: Normal breath sounds. No stridor. No wheezing or rhonchi. Chest:      Chest wall: No tenderness. Abdominal:      General: Bowel sounds are normal. There is no distension. Palpations: Abdomen is soft. There is no mass. Tenderness: There is no abdominal tenderness. Musculoskeletal:         General: No swelling, tenderness or deformity. Normal range of motion. Cervical back: Normal range of motion. No rigidity or tenderness. Skin:     Capillary Refill: Capillary refill takes less than 2 seconds. Coloration: Skin is not jaundiced or pale. Findings: No erythema. Neurological:      General: No focal deficit present. Mental Status: He is alert and oriented to person, place, and time. Mental status is at baseline. Cranial Nerves: No cranial nerve deficit.       Sensory: Sensory deficit present. Motor: No weakness. Coordination: Coordination normal.        Procedures       MDM  Number of Diagnoses or Management Options  Diagnosis management comments: This is a 70-year-old male that presents to the ED with complaints of left-sided headache and left eye pain that started about 30 hours ago and resolved after 6 hours, and bilateral knee weakness and gait unsteadiness for the past 12 hours approximately. He states that he has a history of prior stroke in May, which is 7 months ago, which left residual numbness of the bilateral face, right upper extremity, right lower extremity. He states that he reveals no new numbness or weakness. He states that admission started yesterday, he was pushing an old lady in the snow. He took 3 aspirins after, and symptoms resolved after 6 hours. He denies chest pain, shortness of breath, fevers, chills, diaphoresis. He does mention that he had a sore throat for the past 7 days with excessive mucus. The patient here in the ED is hemodynamically stable, and in no acute distress. They are calm, alert, oriented, and pleasant to speak with. The patient has clear lungs auscultation, no abnormal heart murmurs are heard. The patient has no abdominal pain or tenderness. The patient has good pulses and capillary refill bilaterally in both upper and lower extremities. The patient has no obvious rashes around the body. The patient is neurovascularly intact, with full cranial nerve function, and no motor or sensory deficits throughout the body. NIH stroke scale at this time is 0 with no neurodeficits there is no eye discharge or extremity swellings. The oropharynx is clear without signs of tonsillar exudates, erythema, swelling. EKG is ordered to have documentation of patient's current rhythm, and to rule out any obvious acute cardiac illnesses such as ACS.   Additionally, QT interval may be of use in decision making regarding any medications administered here in the ED. CBC is ordered to evaluate for any signs of infection or inflammation by obtaining a WBC count, or any signs of acute anemia by interpreting hemoglobin. BMP was ordered to evaluate for any electrolyte imbalances, kidney function, or any elevations in anion gap. Viral swabs are ordered to evaluate possible viral etiology of symptoms. Troponin ordered to evaluate for possible cardiac etiology of symptoms including but not limited to STEMI or NSTEMI. Point-of-care glucose ordered to evaluate for hypoglycemia as cause of symptoms. Patient is nontender at this time to any area of the body, and is in no acute distress, and is alert and oriented. Remainder of MDM will be in the ED course below       ED Course as of 12/27/22 0442   [facility] Dec 26, 2022   2002 ATTENDING PROVIDER ATTESTATION:     I have personally performed and/or participated in the history, exam, medical decision making, and procedures and agree with all pertinent clinical information unless otherwise noted. I have also reviewed and agree with the past medical, family and social history unless otherwise noted. I have discussed this patient in detail with the resident, and provided the instruction and education regarding patient here with a history of strokes with right-sided deficits, chronic mild right hand numbness and right foot numbness with mild right facial droop. Developed a left facial and head numbness 2 days ago while being out in the cold pushing somebody have a snow bank. Since then had some mild left sided headache which improved but today noticed that all day he has had sensation of off balance when he walks. No difficulty rest, actually drove himself back up here from [facility], states he is not dizzy and has no difficulty driving or using his extremities when he walks he feels like he is stumbling. No new extremity numbness, tingling, paresthesias or weakness however. No neck or back pain.   No chest pain, palpitations or shortness of breath. .  My findings/plan: Patient sitting the bed resting comfortably no distress. Mild right facial droop. Patient with mild diminished sensation on the left face and scalp when palpated. Arms legs otherwise are neurovascular intact with no focal neurologic deficit on testing, describes a tingling in the right leg however has intact gross sensation. He has no ataxia with the extremities while in the bed. Heart rate regular. Lungs are clear and equal.  Abdomen nontender. Pupils equal, round and reactive to light. He is oriented x3 and pleasantly conversant. [NC]   2019 EKG, normal sinus rhythm 85, normal axis, normal conduction. No acute ischemic ST-T wave changes, otherwise agree with resident. [NC]   2051 EKG: This EKG is signed and interpreted by me. Rate: 85  Rhythm: Sinus, normal AL interval, normal QRS duration, no QT prolongation, no acute ST change  Interpretation: no acute changes  Comparison: stable as compared to patient's most recent EKG []   2152 Case discussed with Jayson Albarado for Dr. Patricia Strange, detailed overview given, they will admit the patient. [NC]   2212 My interpretation of results reveals an elevated troponin at 43, repeat will be ordered. CBC is normal and does not reveal any elevated white count or anemia. Patient's anticoagulation studies are normal, no hypoglycemia based on POCT glucose. Influenza and COVID swabs are negative. Urinalysis reveals no signs of UTI. []   Tue Dec 27, 2022   0129 Patient remained stable at this time, awaiting admission upstairs []      ED Course User Index  [] Erika Monet MD  [NC] Deepthi Palmer, DO       --------------------------------------------- PAST HISTORY ---------------------------------------------  Past Medical History:  has no past medical history on file. Past Surgical History:  has no past surgical history on file. Social History:  reports that he has never smoked.  He does not have any 10.2 mg/dL   Troponin   Result Value Ref Range    Troponin, High Sensitivity 43 (H) 0 - 11 ng/L   Urinalysis with Microscopic   Result Value Ref Range    Color, UA Yellow Straw/Yellow    Clarity, UA Clear Clear    Glucose, Ur >=1000 (A) Negative mg/dL    Bilirubin Urine Negative Negative    Ketones, Urine 15 (A) Negative mg/dL    Specific Gravity, UA 1.025 1.005 - 1.030    Blood, Urine Negative Negative    pH, UA 5.5 5.0 - 9.0    Protein, UA Negative Negative mg/dL    Urobilinogen, Urine 0.2 <2.0 E.U./dL    Nitrite, Urine Negative Negative    Leukocyte Esterase, Urine Negative Negative    WBC, UA NONE 0 - 5 /HPF    RBC, UA NONE 0 - 2 /HPF    Bacteria, UA NONE SEEN None Seen /HPF   Protime-INR   Result Value Ref Range    Protime 11.0 9.3 - 12.4 sec    INR 1.0    APTT   Result Value Ref Range    aPTT 28.6 24.5 - 35.1 sec   Troponin   Result Value Ref Range    Troponin, High Sensitivity 45 (H) 0 - 11 ng/L   Troponin   Result Value Ref Range    Troponin, High Sensitivity 42 (H) 0 - 11 ng/L   SPECIMEN REJECTION   Result Value Ref Range    Rejected Test TRP5     Reason for Rejection see below    POCT Glucose   Result Value Ref Range    Glucose 225 mg/dL    QC OK? ok    POCT Glucose   Result Value Ref Range    Meter Glucose 225 (H) 74 - 99 mg/dL   POCT Glucose   Result Value Ref Range    Meter Glucose 206 (H) 74 - 99 mg/dL   EKG 12 Lead   Result Value Ref Range    Ventricular Rate 85 BPM    Atrial Rate 85 BPM    P-R Interval 152 ms    QRS Duration 82 ms    Q-T Interval 360 ms    QTc Calculation (Bazett) 428 ms    P Axis 34 degrees    R Axis -5 degrees    T Axis -36 degrees       RADIOLOGY:  US CAROTID ARTERY BILATERAL   Final Result   The right internal carotid artery demonstrates 0-50% stenosis. The left internal carotid artery demonstrates 0-50% stenosis. Bilateral vertebral arteries are patent with flow in the normal direction.          CT Head W/O Contrast   Final Result   No acute intracranial hemorrhage or mass effect.          MRI brain with contrast    (Results Pending)     Medications   aspirin EC tablet 81 mg (has no administration in time range)   atorvastatin (LIPITOR) tablet 80 mg (has no administration in time range)   insulin glargine (LANTUS) injection vial 45 Units (has no administration in time range)   lisinopril (PRINIVIL;ZESTRIL) tablet 10 mg (has no administration in time range)   metoprolol succinate (TOPROL XL) extended release tablet 50 mg (has no administration in time range)   sertraline (ZOLOFT) tablet 100 mg (has no administration in time range)   vitamin B-12 (CYANOCOBALAMIN) tablet 1,000 mcg (has no administration in time range)   vitamin D (ERGOCALCIFEROL) capsule 50,000 Units (50,000 Units Oral Not Given 12/27/22 0307)   magnesium sulfate 1000 mg in dextrose 5% 100 mL IVPB (has no administration in time range)   sodium phosphate 12.9 mmol in sodium chloride 0.9 % 250 mL IVPB (has no administration in time range)     Or   sodium phosphate 25.83 mmol in sodium chloride 0.9 % 250 mL IVPB (has no administration in time range)   potassium chloride (KLOR-CON M) extended release tablet 40 mEq (has no administration in time range)     Or   potassium bicarb-citric acid (EFFER-K) effervescent tablet 40 mEq (has no administration in time range)     Or   potassium chloride 10 mEq/100 mL IVPB (Peripheral Line) (has no administration in time range)   perflutren lipid microspheres (DEFINITY) injection 1.5 mL (has no administration in time range)   ondansetron (ZOFRAN-ODT) disintegrating tablet 4 mg (has no administration in time range)     Or   ondansetron (ZOFRAN) injection 4 mg (has no administration in time range)   polyethylene glycol (GLYCOLAX) packet 17 g (has no administration in time range)   enoxaparin (LOVENOX) injection 40 mg (has no administration in time range)   clopidogrel (PLAVIX) tablet 75 mg (has no administration in time range)   labetalol (NORMODYNE;TRANDATE) injection 10 mg (has no administration in time range)   glucose chewable tablet 16 g (has no administration in time range)   dextrose bolus 10% 125 mL (has no administration in time range)     Or   dextrose bolus 10% 250 mL (has no administration in time range)   glucagon (rDNA) injection 1 mg (has no administration in time range)   dextrose 10 % infusion (has no administration in time range)   insulin lispro (HUMALOG) injection vial 0-8 Units (has no administration in time range)   insulin lispro (HUMALOG) injection vial 0-4 Units (0 Units SubCUTAneous Not Given 12/27/22 0003)   aspirin tablet 325 mg (325 mg Oral Given 12/27/22 0001)   clopidogrel (PLAVIX) tablet 75 mg (75 mg Oral Given 12/27/22 0001)       ------------------------- NURSING NOTES AND VITALS REVIEWED ---------------------------  Date / Time Roomed:  12/26/2022  7:07 PM  ED Bed Assignment:  04/04    The nursing notes within the ED encounter and vital signs as below have been reviewed. Patient Vitals for the past 24 hrs:   BP Temp Pulse Resp SpO2 Weight   12/27/22 0203 135/65 -- 85 23 95 % --   12/27/22 0103 (!) 142/70 -- 82 15 95 % --   12/26/22 2208 -- -- -- -- -- 193 lb (87.5 kg)   12/26/22 2129 126/76 -- 72 20 94 % --   12/26/22 2059 130/76 -- 83 19 93 % --   12/26/22 2029 137/73 -- 85 21 92 % --   12/26/22 1908 (!) 181/99 -- -- -- -- --   12/26/22 1900 -- 98.2 °F (36.8 °C) 92 19 98 % --       Oxygen Saturation Interpretation: Normal    ------------------------------------------ PROGRESS NOTES ------------------------------------------  Re-evaluation(s):  Time: Every 30 minutes. Patients symptoms show no change  Repeat physical examination is not changed    Counseling:  I have spoken with the patient and discussed todays results, in addition to providing specific details for the plan of care and counseling regarding the diagnosis and prognosis.   Their questions are answered at this time and they are agreeable with the plan of admission.    --------------------------------- ADDITIONAL PROVIDER NOTES ---------------------------------  Consultations:  Spoke with Dr. Maria Teresa Stafford. Discussed case. They will admit the patient. This patient's ED course included: a personal history and physicial examination, re-evaluation prior to disposition, multiple bedside re-evaluations, IV medications, cardiac monitoring, and continuous pulse oximetry    This patient has remained hemodynamically stable during their ED course. Diagnosis:  1. Cerebrovascular accident (CVA), unspecified mechanism ([facility] [address]        Disposition:  Patient's disposition: Admit to telemetry  Patient's condition is stable. Patient was seen and evaluated by myself and my attending No att. providers found. Assessment and Plan discussed with attending provider, please see attestation for final plan of care.      MD Dave Farris MD  Resident  12/27/22 9766

## 2022-12-27 NOTE — PROGRESS NOTES
Physical Therapy  Physical Therapy Initial Evaluation/Plan of Care    Room #:  2397/0425-10  Patient Name: Claudia Allan  YOB: 1955  MRN: [id number]    Date of Service: 12/27/2022     Tentative placement recommendation: Acute rehab  Equipment recommendation: To be determined      Evaluating Physical Therapist: Sydni Grijalva PT, DPT #[id number]      Specific Provider Orders/Date/Referring Provider :     12/26/22 2200    PT eval and treat  Start:  12/26/22 2200,   End:  12/26/22 2200,   ONE TIME,   Standing Count:  1 Occurrences,   R         Minnie Gutierres DO Acknowledge New     Admitting Diagnosis:   Stroke determined by clinical assessment [facility]) [I63.9]  Cerebrovascular accident (CVA), unspecified mechanism ([facility] [I63.9]      Surgery: none  Visit Diagnoses         Codes    Cerebrovascular accident (CVA), unspecified mechanism ([facility]    -  Primary I63.9            Patient Active Problem List   Diagnosis    Stroke determined by clinical assessment ([facility]    Acute CVA (cerebrovascular accident) ([facility]        ASSESSMENT of Current Deficits Patient exhibits decreased strength, balance, and endurance impairing functional mobility, transfers, gait , gait distance, and tolerance to activity. Pt generally performed bed mobility and standing with Supervision but was moderately unsteady in WB with both HHA and WW use. Pt experienced balance deficits and a L lateral lean during ambulation with 3-4 LOB requiring ModA for correction. Pt would benefit from rehab to improve stability, balance, endurance, gait, and activity tolerance, as well as decrease risk of falls.         PHYSICAL THERAPY  PLAN OF CARE       Physical therapy plan of care is established based on physician order,  patient diagnosis and clinical assessment    Current Treatment Recommendations:    -Bed Mobility: Lower extremity exercises  and Trunk control activities   -Sitting Balance: Incorporate reaching activities to activate trunk muscles , Hands on support to maintain midline , Facilitate active trunk muscle engagement , Facilitate postural control in all planes , and Engage in core activities to allow for movement within base of support   -Standing Balance: Perform strengthening exercises in standing to promote motor control with or without upper extremity support , Instruct patient on adequate base of support to maintain balance, and Challenge balance utilizing reaching  activities beyond center of gravity    -Transfers: Provide instruction on proper hand and foot position for adequate transfer of weight onto lower extremities and use of gait device if needed, Cues for hand placement, technique and safety. Provide stabilization to prevent fall , Facilitate weight shift forward on to lower extremities and provide necessary stabilization of bilateral lower extremities , Support transfer of weight on to lower extremities, and Assist with extension of knees trunk and hip to accept weight transfer   -Gait: Gait training, Standing activities to improve: base of support, weight shift, weight bearing , Exercises to improve trunk control, Exercises to improve hip and knee control, Performance of protected weight bearing activities, and Activities to increase weight bearing   -Endurance: Utilize Supervised activities to increase level of endurance to allow for safe functional mobility including transfers and gait  and Use graduated activities to promote good breathing techniques and provide support and education to maximize respiratory function  -Stairs: Stair training with instruction on proper technique and hand placement on rail    PT long term treatment goals are located in below grid    Patient and or family understand(s) diagnosis, prognosis, and plan of care. Frequency of treatments: Patient will be seen  twice daily.          Prior Level of Function: Patient ambulated independently   Rehab Potential: good - for baseline    Past medical history:   History reviewed. No pertinent past medical history. History reviewed. No pertinent surgical history. SUBJECTIVE:    Precautions: Up with assistance, falls and balance deficits, multiple LOB during ambulation      Social history: Patient lives alone in a ranch home  with Ramp  to enter Walk in shower  , grab bars      Equipment owned: Cane, Rollator, Electric wheelchair, and Elevated toilet [address]   How much difficulty turning over in bed?: A Little  How much difficulty sitting down on / standing up from a chair with arms?: A Little  How much difficulty moving from lying on back to sitting on side of bed?: A Little  How much help from another person moving to and from a bed to a chair?: A Little  How much help from another person needed to walk in hospital room?: A Lot  How much help from another person for climbing 3-5 steps with a railing?: A Lot  AM-PAC Inpatient Mobility Raw Score : 16  AM-PAC Inpatient T-Scale Score : 40.78  Mobility Inpatient CMS 0-100% Score: 54.16  Mobility Inpatient CMS G-Code Modifier : CK    Nursing cleared patient for PT evaluation. The admitting diagnosis and active problem list as listed above have been reviewed prior to the initiation of this evaluation. OBJECTIVE;   Initial Evaluation  Date: 12/27/2022 Treatment Date:     Short Term/ Long Term   Goals   Was pt agreeable to Eval/treatment? Yes  To be met in 3 days   Pain level   0/10       Bed Mobility    Rolling: Supervision     Supine to sit: Supervision     Sit to supine: Supervision     Scooting: Supervision     Rolling: Independent    Supine to sit:  Independent    Sit to supine: Independent    Scooting: Independent     Transfers Sit to stand: Supervision    Sit to stand: Independent    Ambulation     2 x 40 feet using  WW and HHA with Moderate assist of 1   for walker control, walker approximation, balance, upright, weight shift, multiplane instability, and safety    > 150 feet using  least restrictive device with Supervision     Stair negotiation: ascended and descended   Not assessed       10 steps, 1 rail with Supervision   ROM Within functional limits    Increase range of motion 10% of affected joints    Strength BUE:  refer to OT eval  RLE:  4+/5  LLE:  4+/5  Increase strength in affected mm groups by 1/3 grade   Balance Sitting EOB:  good-  Dynamic Standing:  fair -  Sitting EOB:  good   Dynamic Standing: fair with LRD     Patient is Alert & Oriented x person, place, time, and situation and follows directions    Sensation:  Patient  denies numbness/tingling   Edema:  no   Endurance: fair      Vitals: room air   Blood Pressure at rest  Blood Pressure during session    Heart Rate at rest  Heart Rate during session    SPO2 at rest %  SPO2 during session %     Patient education  Patient educated on role of Physical Therapy, risks of immobility, safety and plan of care, energy conservation,  importance of mobility while in hospital , purse lip breathing, ankle pumps, quad set and glut set for edema control, blood clot prevention, importance and purpose of adaptive device and adjusted to proper height for the patient. , safety , and stair training      Patient response to education:   Pt verbalized understanding Pt demonstrated skill Pt requires further education in this area   Yes Partial Yes      Treatment:  Patient practiced and was instructed/facilitated in the following treatment: Patient Sat edge of bed 15 minutes with Supervision  to increase dynamic sitting balance and activity tolerance. Pt performed bed mobility, transfers, ambulation in hallway, coordination and strength testing, gait training. Therapeutic Exercises:  not performed      At end of session, patient in bed with     call light and phone within reach,  all lines and tubes intact, nursing notified.       Patient would benefit from continued skilled Physical Therapy to improve functional independence and quality of life. Patient's/ family goals   rehab    Time in  1052  Time out  1123    Total Treatment Time  11 minutes    Evaluation time includes thorough review of current medical information, gathering information on past medical history/social history and prior level of function, completion of standardized testing/informal observation of tasks, assessment of data, and development of Plan of care and goals.      CPT codes:  Low Complexity PT evaluation (75922)  Gait Training (55063) 11 minutes 1 unit(s)    Erich Teran, PT

## 2022-12-27 NOTE — H&P
Department of Internal Medicine  History and Physical Examination     Primary Care Physician: Alton Sosa MD   Admitting Physician:  Ran Gore DO  Admission date and time: 12/26/2022  7:07 PM    Room:  [address] [place] [address]  Admitting diagnosis: Stroke determined by clinical assessment [facility]) [I63.9]  Cerebrovascular accident (CVA), unspecified mechanism [facility]) [I63.9]    Patient Name: Dusty Lacey  MRN: [id number]    Date of Service: 12/27/2022     Chief Complaint: Right-sided numbness and tingling    HISTORY OF PRESENT ILLNESS:    Dusty Lacey is a 78-year-old male who presented to [address] [place] [address] emergency department last evening with 12 to 24 hours worth of neurologic complaint. States that he had a prior stroke in the past, approximately 7 months ago, with residual left-sided symptoms. He developed numbness and tingling with his prior stroke. With the stroke he has a mild facial sensation changes but had primarily an ataxic gait. Mild headache was also noted. ER work-up returned relatively benign. As he had had some upper respiratory symptoms, rapid COVID and flu testing was obtained and returned negative. CBC was unremarkable. Metabolic panel revealed hyperglycemia but was otherwise unremarkable. High-sensitivity troponin x2 were mildly elevated but did not trend upward to suggest acute coronary syndrome. Urinalysis unremarkable. Coagulation studies negative. EKG was felt to be nonischemic. CT of the head without contrast returned negative for any acute intracranial hemorrhage or mass-effect. Case was discussed with ER physician, patient was hypertensive and this was felt to be a potential contributor. We discussed admission, stroke management. Patient seen examined bedside today. No new/acute tingling. Ataxic gait persist.  No headache. No other acute neurologic complaints. Positive for prior stroke deficits. No fevers or chills. No known sick contacts or exposures.   Improved sinus and nasal congestion. Coughing at times without sputum or hemoptysis. No chest pain or palpitations, fluttering. No abdominal pain, nausea, vomiting or bowel changes, hematochezia or melena. No acute urinary complaints. PAST MEDICAL Hx:  History reviewed. No pertinent past medical history. PAST SURGICAL Hx:   History reviewed. No pertinent surgical history. FAMILY Hx:  History reviewed. No pertinent family history. HOME MEDICATIONS:  Prior to Admission medications    Medication Sig Start Date End Date Taking? Authorizing Provider   Cholecalciferol (VITAMIN D3) 50 MCG (2000 UT) CAPS Take 2,000 Units by mouth Daily   Yes Historical Provider, MD   ciprofloxacin (CIPRO) 250 MG tablet Take 250 mg by mouth 2 times daily   Yes Historical Provider, MD   metoprolol succinate (TOPROL XL) 50 MG extended release tablet Take 50 mg by mouth daily    Historical Provider, MD   sertraline (ZOLOFT) 100 MG tablet Take 100 mg by mouth daily    Historical Provider, MD   atorvastatin (LIPITOR) 80 MG tablet Take 80 mg by mouth daily    Historical Provider, MD   lisinopril (PRINIVIL;ZESTRIL) 10 MG tablet Take 10 mg by mouth daily    Historical Provider, MD   dapagliflozin (FARXIGA) 5 MG tablet Take 5 mg by mouth every morning    Historical Provider, MD   metFORMIN (GLUCOPHAGE-XR) 500 MG extended release tablet Take 500 mg by mouth in the morning and at bedtime    Historical Provider, MD   Insulin Glargine, 2 Unit Dial, (TOUJEO MAX SOLOSTAR) 300 UNIT/ML SOPN Inject 45 Units into the skin daily    Historical Provider, MD   vitamin B-12 (CYANOCOBALAMIN) 1000 MCG tablet Take 1,000 mcg by mouth daily    Historical Provider, MD   aspirin 81 MG EC tablet Take 81 mg by mouth daily    Historical Provider, MD       ALLERGIES:  Latex    SOCIAL Hx:  Social History     Socioeconomic History    Marital status:       Spouse name: Not on file    Number of children: Not on file    Years of education: Not on file    Highest education level: Not on file   Occupational History    Not on file   Tobacco Use    Smoking status: Never    Smokeless tobacco: Not on file   Substance and Sexual Activity    Alcohol use: Yes     Alcohol/week: 4.0 - 5.0 standard drinks     Types: 4 - 5 Cans of beer per week    Drug use: Never    Sexual activity: Not on file   Other Topics Concern    Not on file   Social History Narrative    Not on file     Social Determinants of Health     Financial Resource Strain: Not on file   Food Insecurity: Not on file   Transportation Needs: Not on file   Physical Activity: Not on file   Stress: Not on file   Social Connections: Not on file   Intimate Partner Violence: Not on file   Housing Stability: Not on file       ROS: 12 point review of symptoms was conducted, pertinent positives and negative were reviewed, aside from that all 12 systems were reviewed and negative. PHYSICAL EXAM:  VITALS:  Vitals:    12/27/22 0834   BP: (!) 163/86   Pulse: (!) 102   Resp: 15   Temp: 97.9 °F (36.6 °C)   SpO2: 94%         CONSTITUTIONAL:    Awake, alert, cooperative, no apparent distress    EYES:    PERRL, EOMI, sclera clear without icterus, conjunctiva normal    ENT:    Normocephalic, atraumatic, sinuses nontender on palpation. External ears without lesions. Oral pharynx with moist mucus membranes. NECK:    Supple, symmetrical, trachea midline, no adenopathy, thyroid symmetric, not enlarged and no tenderness, skin normal, no bruits, no JVD    HEMATOLOGIC/LYMPHATICS:    No cervical lymphadenopathy and no supraclavicular lymphadenopathy    LUNGS:    Symmetric.  No increased work of breathing, normal air exchange, clear to auscultation bilaterally, no wheezes, rhonchi, or rales,     CARDIOVASCULAR:    Normal apical impulse, regular rate and rhythm, normal S1 and S2, systolic murmur noted    ABDOMEN:    Normal contour, normal bowel sounds, soft, non-distended, non-tender, no rebound or guarding elicited on palpation     MUSCULOSKELETAL:    There is no redness, warmth, or swelling of the joints. Tone is normal.    NEUROLOGIC:    Awake, alert, oriented to name, place and time. Reported perceived mild sensory loss of the face and extremities chronically with no acute changes reported. Ataxic gait, otherwise cranial nerves II-XII are grossly intact. No significant focal/unilateral neurologic deficits appreciated in an acute fashion    SKIN:    No bruising or bleeding. No redness, warmth, or swelling    EXTREMITIES:    Peripheral pulses present.   No significant pitting edema    LABORATORY DATA:  CBC with Differential:    Lab Results   Component Value Date/Time    WBC 9.3 12/27/2022 05:07 AM    RBC 5.64 12/27/2022 05:07 AM    HGB 16.2 12/27/2022 05:07 AM    HCT 48.7 12/27/2022 05:07 AM     12/27/2022 05:07 AM    MCV 86.3 12/27/2022 05:07 AM    MCH 28.7 12/27/2022 05:07 AM    MCHC 33.3 12/27/2022 05:07 AM    RDW 12.8 12/27/2022 05:07 AM    LYMPHOPCT 29.7 12/27/2022 05:07 AM    MONOPCT 7.3 12/27/2022 05:07 AM    BASOPCT 0.6 12/27/2022 05:07 AM    MONOSABS 0.68 12/27/2022 05:07 AM    LYMPHSABS 2.76 12/27/2022 05:07 AM    EOSABS 0.28 12/27/2022 05:07 AM    BASOSABS 0.06 12/27/2022 05:07 AM     BMP:    Lab Results   Component Value Date/Time     12/27/2022 05:07 AM    K 4.4 12/27/2022 05:07 AM     12/27/2022 05:07 AM    CO2 21 12/27/2022 05:07 AM    BUN 20 12/27/2022 05:07 AM    LABALBU 4.0 12/27/2022 05:07 AM    CREATININE 0.9 12/27/2022 05:07 AM    CALCIUM 10.3 12/27/2022 05:07 AM    GFRAA >60 04/20/2022 09:41 AM    LABGLOM >60 12/27/2022 05:07 AM    GLUCOSE 230 12/27/2022 05:07 AM     HgBA1c:  Pending  FLP:    Lab Results   Component Value Date/Time    TRIG 231 12/27/2022 05:07 AM    HDL 43 12/27/2022 05:07 AM    LDLCALC 167 12/27/2022 05:07 AM    LABVLDL 46 12/27/2022 05:07 AM       ASSESSMENT:  Acute versus subacute stroke involving the left posterior lateral juan miguel, superimposed on cerebrovascular disease with prior CVA  Viral URI with negative work-up thus far  Poorly controlled essential hypertension  Poorly controlled hyperlipidemia  Poorly controlled Insulin-dependent diabetes mellitus type 2  Noncompliance with medication and treatment regimen leading directly to negative healthcare outcomes    PLAN:  Natalya Webb 39-year-old male who presented to [address] [place] [address] with complaints of sensory changes with known cerebrovascular disease and prior CVA. Acute versus subacute stroke involving the left posterior lateral juan miguel has been identified. Dual antiplatelet therapy high-dose statin have been implemented. Lipid panel shows poor control and this will need to monitor closely. Blood pressure medications will be adjusted as well for gradual blood pressure reduction as we are now past the window for permissive hypertension. Echo with bubble study will be obtained. Carotid ultrasound has been reviewed as well showing no significant hemodynamic narrowing or stenosis. We will address diabetic control as well as he is persistently hyperglycemic despite high-dose basal insulin. It has become overly apparent that a large portion of this particular event was precipitated by intermittent compliance with medication and treatment regimen. We have reinforced the absolute need for compliance with the patient. PT, OT, speech and social work will follow for discharge planning purposes. Acute rehab versus home with aggressive outpatient therapy . Underlying co-morbidites will be addressed during hospitalization as well. Labs and vital signs will be monitored closely and addressed accordingly. See additional orders for details.      ANASTASIA Higginbothma CNP  8:44 AM  12/27/2022    Electronically signed by ANASTASIA Higginbotham CNP on 12/27/22 at 8:44 AM EST

## 2022-12-27 NOTE — PROGRESS NOTES
SPEECH/LANGUAGE PATHOLOGY  SPEECH/LANGUAGE/COGNITIVE EVALUATION   and PLAN OF CARE      PATIENT NAME:  Alvina Dong  (male)     MRN:  [id number]    :  1955  (76 y.o.)  STATUS:  Inpatient: Room 0421/0421-01    TODAY'S DATE:  2022    Speech Language Pathology (SLP) eval and treat  Start:  22,   End:  22,   ONE TIME,   Standing Count:  1 Occurrences,   R         [employer], DO   REASON FOR REFERRAL: to further assess cognitive-linguistic abilities  EVALUATING THERAPIST: Yeison Salinas SLP    ADMITTING DIAGNOSIS: Stroke determined by clinical assessment ([facility] [I63.9]  Cerebrovascular accident (CVA), unspecified mechanism ([facility] [I63.9]    VISIT DIAGNOSIS:   Visit Diagnoses         Codes    Cerebrovascular accident (CVA), unspecified mechanism ([facility]    -  Primary I63.9             SPEECH THERAPY  PLAN OF CARE   The speech therapy  POC is established based on physician order, speech pathology diagnosis and results of clinical assessment     SPEECH PATHOLOGY DIAGNOSIS:    Within function limits    Speech Pathology intervention is not warranted at this time. Conditions Requiring Skilled Therapeutic Intervention for speech, language and/or cognition  Not applicable     Specific Speech Therapy Interventions to Include:   Not applicable    Specific instructions for next treatment:    Not applicable    SHORT/LONG TERM GOALS  Not applicable      Patient goals: Patient/family involved in developing goals and treatment plan:   Treatment goals discussed with Patient    The Patient understand(s) the diagnosis, prognosis and plan of care   The patient/family Agreed with above,     This plan may be re-evaluated and revised as warranted.         Rehabilitation Potential/Prognosis: not applicable                CLINICAL ASSESSMENT:  MOTOR SPEECH       Oral Peripheral Examination   Adequate lingual/labial strength     Parameters of Speech Production  Respiration:  Adequate for speech production  Articulation:  Within functional limits  Resonance:  Within functional limits  Quality:   Within functional limits  Pitch: Within functional limits  Intensity: Within functional limits  Fluency:  Intact  Prosody Intact    RECEPTIVE LANGUAGE    Comprehension of Yes/No Questions: Within functional limits    Process  Simple Verbal Commands:   Within functional limits  Process Intermediate Verbal Commands:   Within functional limits  Process Complex Verbal Commands:     Within functional limits    Comprehension of Conversation:      Within functional limits      EXPRESSIVE LANGUAGE     Serials: Functional    Imitation:  Words   Functional   Sentences Functional    Naming:  (Modality used:  Verbal)  Confrontation Naming  Functional  Functional Description  Functional  Response Naming: Functional    Conversation:      Conversation was within functional limits    COGNITION     Attention/Orientation  Attention: Sustained attention   Orientation:  Oriented to Person, Place, Date, Reason for hospitalization    Memory   Immediate Recall: Repeated 3/3    Delayed Recall:   Recalled  2/3  Long Term Recall:   Recalled Address, Birthdate, Age, and Family    Organization/Problem Solving/Reasoning   Verbal Sequencing:   Functional        Verbal Problem solving:   Functional          CLINICAL OBSERVATIONS NOTED DURING THE EVALUATION  Within functional limits                  EDUCATION:   The Speech Language Pathologist (SLP) completed education regarding results of evaluation and that intervention is not warranted at this time.   Learner: Patient  Education: Reviewed results and recommendations of this evaluation  Evaluation of Education:  Verbalizes understanding    Evaluation Time includes thorough review of current medical information, gathering information on past medical history/social history and prior level of function, completion of standardized testing/informal observation of tasks, assessment of data and education on plan of care and goals. CPT code:    72415  eval speech sound lang comprehension      The admitting diagnosis and active problem list, as listed below have been reviewed prior to initiation of this evaluation.         ACTIVE PROBLEM LIST:   Patient Active Problem List   Diagnosis    Stroke determined by clinical assessment ([facility]    [facility]       Felton Alejandro M.S., [address] [facility] Pathologist  [facility] [phone]

## 2022-12-27 NOTE — PROGRESS NOTES
SPEECH/LANGUAGE PATHOLOGY  CLINICAL ASSESSMENT OF SWALLOWING FUNCTION   and PLAN OF CARE    PATIENT NAME:  Tiesha Haque  (male)     MRN:  [id number]    :  1955  (75 y.o.)  STATUS:  Inpatient: Room 042-    T122    Speech Language Pathology (SLP) eval and treat  Start:  22,   End:  22,   ONE TIME,   Standing Count:  1 Occurrences,   R         DO JAVIER Naranjo'S DATE:  2022    REASON FOR REFERRAL: to further assess oropharyngeal function   EVALUATING THERAPIST: AISHA Laird                 RESULTS:    DYSPHAGIA DIAGNOSIS:   Clinical indicators of minimal-mild oropharyngeal phase dysphagia       DIET RECOMMENDATIONS:  Soft and bite size consistency solids (IDDSI level 6) with  thin liquids (IDDSI level 0) and Administer medication whole, ONE AT A TIME, in pudding/applesauce     FEEDING RECOMMENDATIONS:     Assistance level:  Encourage self-feeding      Compensatory strategies recommended: Small bites/sips, Alternate solids and liquids, and No straw      Discussed recommendations with nursing and/or faxed report to referring provider: Yes    SPEECH THERAPY  PLAN OF CARE   The dysphagia POC is established based on physician order, dysphagia diagnosis and results of clinical assessment     Skilled SLP intervention for dysphagia management up to 5x per week until goals met, pt plateaus in function and/or discharged from hospital    Conditions Requiring Skilled Therapeutic Intervention for dysphagia:    Patient is performing below functional baseline d/t  current acute condition, Multiple diagnoses, multiple medications, and increased dependency upon caregivers.     Specific dysphagia interventions to include:     Compensatory strategy training   Trials of upgraded diet/liquid     Specific instructions for next treatment:  ongoing PO analysis to upgrade diet and evaluate tolerance of current PO recommendation and initiate instruction of compensatory strategies  Patient Treatment Goals:    Short Term Goals:  Pt will implement identified compensatory swallowing strategies on 90% of opportunities or greater to improve airway protection and swallow function. Pt will complete PO trials of upgraded diet textures with SLP only to determine the least restrictive PO diet to maintain adequate nutrition/hydration with no more than 1 overt s/s of pen/asp. Long Term Goals:   Pt will maintain adequate nutrition/hydration via PO intake of the least restrictive oral diet with implementation of safe swallow/ compensatory strategies and decrease signs/symptoms of aspiration to less than 1 x/day. Pt will improve oropharyngeal swallow function to ensure airway protection during PO intake to maintain adequate nutrition/hydration and decrease signs/symptoms of aspiration to less than 1 x/day.    A Video Swallow Study (MBSS) is recommended and requires a physician order IF silent aspiration is suspected based on medical presentation     Patient/family Goal:    To be able to [facility] discussed with Patient   The Patient understand(s) the diagnosis, prognosis and plan of care     Rehabilitation Potential/Prognosis: good                    ADMITTING DIAGNOSIS: Stroke determined by clinical assessment ([facility] [address] [I63.9]  Cerebrovascular accident (CVA), unspecified mechanism ([facility] [address] [I63.9]    VISIT DIAGNOSIS:   Visit Diagnoses         Codes    Cerebrovascular accident (CVA), unspecified mechanism ([facility] [address]    -  Primary I63.9             PATIENT REPORT/COMPLAINT: occasional cough  RN cleared patient for participation in assessment     yes     PRIOR LEVEL OF SWALLOW FUNCTION:    PAST HISTORY OF DYSPHAGIA?: none reported    Home diet: Regular consistency solids (IDDSI level 7) with  thin liquids (IDDSI level 0)  Current Diet Order:  Diet NPO    PROCEDURE:  Consistencies Administered During the Evaluation   Liquids: thin liquid   Solids:  pureed foods and solid foods      Method of Intake:   cup, straw, spoon  Self fed      Position:   Seated, upright    CLINICAL ASSESSMENT:  Oral Stage: The oral stage of swallowing was within functional limits for consistencies administered      Pharyngeal Stage:    Throat clearing present after presentation of thin liquid via straw x1. No further throat clearing present w/ continuous cup sips of thin via cup or w/ puree/solids. Cognition:   Within functional limits for this exam    Oral Peripheral Examination   Adequate lingual/labial strength     Current Respiratory Status    room air     Parameters of Speech Production  Respiration:  Adequate for speech production  Quality:   Within functional limits  Intensity: Within functional limits    Volitional Swallow: present     Volitional Cough:   present     Pain: No pain reported. EDUCATION:   The Speech Language Pathologist (SLP) completed education regarding results of evaluation and that intervention is warranted at this time. Learner: Patient  Education: Reviewed results and recommendations of this evaluation  Evaluation of Education:  Eliud Noland understanding    This plan may be re-evaluated and revised as warranted. Evaluation Time includes thorough review of current medical information, gathering information on past medical history/social history and prior level of function, completion of standardized testing/informal observation of tasks, assessment of data and education on plan of care and goals. [x]The admitting diagnosis and active problem list, have been reviewed prior to initiation of this evaluation. ACTIVE PROBLEM LIST:   Patient Active Problem List   Diagnosis    Stroke determined by clinical assessment ([facility] [address]    Acute CVA (cerebrovascular accident) ([facility] [address]         CPT code:  20344  bedside swallow eval      Intervention:     56836  dysphagia tx: Patient seen in room for f/u for dysphagia mgmt.  SLP reviewed results and recommendation of Clinical Assessment of Swallow Function. SLP recommends soft and bite sized consistency solids w/ thin liquids via cup-no straw. Patient to utilize small bites/sips, slow rate, and alt solids/liquids during intake. Patient verbalized understanding and agreement. Will cont w/ POC.        Haylee Morrow M.S., [address] Pathologist  [phone]

## 2022-12-28 ENCOUNTER — APPOINTMENT (OUTPATIENT)
Dept: GENERAL RADIOLOGY | Age: 67
DRG: 066 | End: 2022-12-28
Payer: MEDICARE

## 2022-12-28 LAB
ALBUMIN SERPL-MCNC: 4.1 G/DL (ref 3.5–5.2)
ALP BLD-CCNC: 95 U/L (ref 40–129)
ALT SERPL-CCNC: 18 U/L (ref 0–40)
ANION GAP SERPL CALCULATED.3IONS-SCNC: 8 MMOL/L (ref 7–16)
AST SERPL-CCNC: 14 U/L (ref 0–39)
BASOPHILS ABSOLUTE: 0.04 E9/L (ref 0–0.2)
BASOPHILS RELATIVE PERCENT: 0.4 % (ref 0–2)
BILIRUB SERPL-MCNC: 0.9 MG/DL (ref 0–1.2)
BILIRUBIN DIRECT: <0.2 MG/DL (ref 0–0.3)
BILIRUBIN, INDIRECT: NORMAL MG/DL (ref 0–1)
BUN BLDV-MCNC: 27 MG/DL (ref 6–23)
CALCIUM SERPL-MCNC: 10.6 MG/DL (ref 8.6–10.2)
CHLORIDE BLD-SCNC: 104 MMOL/L (ref 98–107)
CO2: 25 MMOL/L (ref 22–29)
CREAT SERPL-MCNC: 1.1 MG/DL (ref 0.7–1.2)
EKG ATRIAL RATE: 85 BPM
EKG P AXIS: 34 DEGREES
EKG P-R INTERVAL: 152 MS
EKG Q-T INTERVAL: 360 MS
EKG QRS DURATION: 82 MS
EKG QTC CALCULATION (BAZETT): 428 MS
EKG R AXIS: -5 DEGREES
EKG T AXIS: -36 DEGREES
EKG VENTRICULAR RATE: 85 BPM
EOSINOPHILS ABSOLUTE: 0.31 E9/L (ref 0.05–0.5)
EOSINOPHILS RELATIVE PERCENT: 3.3 % (ref 0–6)
GFR SERPL CREATININE-BSD FRML MDRD: >60 ML/MIN/1.73
GLUCOSE BLD-MCNC: 200 MG/DL (ref 74–99)
HCT VFR BLD CALC: 46 % (ref 37–54)
HEMOGLOBIN: 15.8 G/DL (ref 12.5–16.5)
IMMATURE GRANULOCYTES #: 0.03 E9/L
IMMATURE GRANULOCYTES %: 0.3 % (ref 0–5)
LYMPHOCYTES ABSOLUTE: 2.87 E9/L (ref 1.5–4)
LYMPHOCYTES RELATIVE PERCENT: 30.3 % (ref 20–42)
MAGNESIUM: 2.3 MG/DL (ref 1.6–2.6)
MCH RBC QN AUTO: 29.4 PG (ref 26–35)
MCHC RBC AUTO-ENTMCNC: 34.3 % (ref 32–34.5)
MCV RBC AUTO: 85.5 FL (ref 80–99.9)
METER GLUCOSE: 180 MG/DL (ref 74–99)
METER GLUCOSE: 195 MG/DL (ref 74–99)
METER GLUCOSE: 195 MG/DL (ref 74–99)
METER GLUCOSE: 212 MG/DL (ref 74–99)
MONOCYTES ABSOLUTE: 0.74 E9/L (ref 0.1–0.95)
MONOCYTES RELATIVE PERCENT: 7.8 % (ref 2–12)
NEUTROPHILS ABSOLUTE: 5.48 E9/L (ref 1.8–7.3)
NEUTROPHILS RELATIVE PERCENT: 57.9 % (ref 43–80)
PDW BLD-RTO: 12.7 FL (ref 11.5–15)
PHOSPHORUS: 2.9 MG/DL (ref 2.5–4.5)
PLATELET # BLD: 234 E9/L (ref 130–450)
PMV BLD AUTO: 11.8 FL (ref 7–12)
POTASSIUM SERPL-SCNC: 4.5 MMOL/L (ref 3.5–5)
RBC # BLD: 5.38 E12/L (ref 3.8–5.8)
SODIUM BLD-SCNC: 137 MMOL/L (ref 132–146)
TOTAL PROTEIN: 6.7 G/DL (ref 6.4–8.3)
WBC # BLD: 9.5 E9/L (ref 4.5–11.5)

## 2022-12-28 PROCEDURE — 97110 THERAPEUTIC EXERCISES: CPT

## 2022-12-28 PROCEDURE — 1200000000 HC SEMI PRIVATE

## 2022-12-28 PROCEDURE — 97530 THERAPEUTIC ACTIVITIES: CPT

## 2022-12-28 PROCEDURE — 36415 COLL VENOUS BLD VENIPUNCTURE: CPT

## 2022-12-28 PROCEDURE — 97165 OT EVAL LOW COMPLEX 30 MIN: CPT

## 2022-12-28 PROCEDURE — 92526 ORAL FUNCTION THERAPY: CPT | Performed by: SPEECH-LANGUAGE PATHOLOGIST

## 2022-12-28 PROCEDURE — 80076 HEPATIC FUNCTION PANEL: CPT

## 2022-12-28 PROCEDURE — 2500000003 HC RX 250 WO HCPCS: Performed by: INTERNAL MEDICINE

## 2022-12-28 PROCEDURE — 6360000002 HC RX W HCPCS: Performed by: INTERNAL MEDICINE

## 2022-12-28 PROCEDURE — 85025 COMPLETE CBC W/AUTO DIFF WBC: CPT

## 2022-12-28 PROCEDURE — 6370000000 HC RX 637 (ALT 250 FOR IP): Performed by: NURSE PRACTITIONER

## 2022-12-28 PROCEDURE — 92611 MOTION FLUOROSCOPY/SWALLOW: CPT | Performed by: SPEECH-LANGUAGE PATHOLOGIST

## 2022-12-28 PROCEDURE — 6370000000 HC RX 637 (ALT 250 FOR IP): Performed by: INTERNAL MEDICINE

## 2022-12-28 PROCEDURE — 84100 ASSAY OF PHOSPHORUS: CPT

## 2022-12-28 PROCEDURE — 82962 GLUCOSE BLOOD TEST: CPT

## 2022-12-28 PROCEDURE — 93010 ELECTROCARDIOGRAM REPORT: CPT | Performed by: INTERNAL MEDICINE

## 2022-12-28 PROCEDURE — 80048 BASIC METABOLIC PNL TOTAL CA: CPT

## 2022-12-28 PROCEDURE — 74230 X-RAY XM SWLNG FUNCJ C+: CPT

## 2022-12-28 PROCEDURE — 83735 ASSAY OF MAGNESIUM: CPT

## 2022-12-28 RX ORDER — PANTOPRAZOLE SODIUM 40 MG/1
40 TABLET, DELAYED RELEASE ORAL
Status: DISCONTINUED | OUTPATIENT
Start: 2022-12-28 | End: 2022-12-29 | Stop reason: HOSPADM

## 2022-12-28 RX ORDER — ATORVASTATIN CALCIUM 80 MG/1
80 TABLET, FILM COATED ORAL DAILY
Qty: 30 TABLET | Refills: 3 | Status: SHIPPED | OUTPATIENT
Start: 2022-12-28

## 2022-12-28 RX ORDER — METOPROLOL SUCCINATE 25 MG/1
75 TABLET, EXTENDED RELEASE ORAL DAILY
Qty: 30 TABLET | Refills: 3 | Status: SHIPPED | OUTPATIENT
Start: 2022-12-28

## 2022-12-28 RX ORDER — FLUTICASONE PROPIONATE 50 MCG
2 SPRAY, SUSPENSION (ML) NASAL DAILY
Status: DISCONTINUED | OUTPATIENT
Start: 2022-12-28 | End: 2022-12-29 | Stop reason: HOSPADM

## 2022-12-28 RX ADMIN — LISINOPRIL 20 MG: 20 TABLET ORAL at 08:55

## 2022-12-28 RX ADMIN — CLOPIDOGREL BISULFATE 75 MG: 75 TABLET ORAL at 08:55

## 2022-12-28 RX ADMIN — FLUTICASONE PROPIONATE 2 SPRAY: 50 SPRAY, METERED NASAL at 12:19

## 2022-12-28 RX ADMIN — Medication 2000 UNITS: at 08:57

## 2022-12-28 RX ADMIN — PANTOPRAZOLE SODIUM 40 MG: 40 TABLET, DELAYED RELEASE ORAL at 08:55

## 2022-12-28 RX ADMIN — BARIUM SULFATE 45 G: 0.81 POWDER, FOR SUSPENSION ORAL at 14:15

## 2022-12-28 RX ADMIN — CYANOCOBALAMIN TAB 1000 MCG 1000 MCG: 1000 TAB at 08:56

## 2022-12-28 RX ADMIN — INSULIN LISPRO 2 UNITS: 100 INJECTION, SOLUTION INTRAVENOUS; SUBCUTANEOUS at 17:08

## 2022-12-28 RX ADMIN — ENOXAPARIN SODIUM 40 MG: 100 INJECTION SUBCUTANEOUS at 08:58

## 2022-12-28 RX ADMIN — ATORVASTATIN CALCIUM 80 MG: 40 TABLET, FILM COATED ORAL at 08:57

## 2022-12-28 RX ADMIN — METOPROLOL SUCCINATE 75 MG: 50 TABLET, EXTENDED RELEASE ORAL at 08:49

## 2022-12-28 RX ADMIN — INSULIN GLARGINE 20 UNITS: 100 INJECTION, SOLUTION SUBCUTANEOUS at 20:16

## 2022-12-28 RX ADMIN — INSULIN GLARGINE 40 UNITS: 100 INJECTION, SOLUTION SUBCUTANEOUS at 09:58

## 2022-12-28 RX ADMIN — BARIUM SULFATE 45 ML: 400 SUSPENSION ORAL at 14:16

## 2022-12-28 RX ADMIN — ASPIRIN 81 MG: 81 TABLET, COATED ORAL at 08:48

## 2022-12-28 RX ADMIN — BARIUM SULFATE 45 ML: 400 PASTE ORAL at 14:15

## 2022-12-28 NOTE — CARE COORDINATION
Ss note: 12/28/20222:51 PM discharge order noted. WILL NEED GARDENIA/PCR TEST  prior to discharge for acute rehab. PRECERT for [facility] per on line [employer] [facility] is still pending. Pt is home alone, dtr currently visiting from out of town and may  be able to transport at discharge, [facility] [address] [facility]  CARLYLE Shaw

## 2022-12-28 NOTE — CARE COORDINATION
Ss note:12/28/20229:15 AM  discharge order noted. Neg rapid covid on 12-26-22. WILL NEED A GARDENIA/PCR COVID TEST for acute rehab. Consult noted for [facility]. 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 Pt follows with Denise Thompson. Pt has equipment that belonged to his wife whom he lost 4 yrs ago to [address] [employer]. Pts dtr Zachery is visiting, due to return home on Friday ([address]). , pt uses [address] [place]. Pt reports a hx of CVA & hx of outpt therapy at [facility]. No hx of HHC or SNF. Pt agreeable to referral to [facility] Acute Rehab, per kevin Alcala Cea pt has been accepted and she is submitting for [facility], will need to await insurance approval. Dtr may be able to transport pt to [facility]. SW will follow.  CARLYLE Alamo

## 2022-12-28 NOTE — DISCHARGE INSTRUCTIONS
Your information:  Name: Dante Holloway  : 1955    Your instructions:    Discharged to [facility]. Please make and keep any follow up appointments. What to do after you leave the hospital:    Recommended diet: diabetic diet    Recommended activity: activity as tolerated        The following personal items were collected during your admission and were returned to you:    Belongings  Dental Appliances: None  Vision - Corrective Lenses: Eyeglasses  Hearing Aid: None  Clothing: Footwear, Pants, Shirt, Undergarments, Socks, Jacket/Coat  Jewelry: Ring, Necklace, Watch  Body Piercings Removed: N/A  Electronic Devices: Cell Phone,   Weapons (Notify Protective Services/Security): None  Other Valuables: Wallet, Keys, Cane  Home Medications: Sent to pharmacy  Valuables Given To: Patient  Provide Name(s) of Who Valuable(s) Were Given To: n/a  Responsible person(s) in the waiting room: n/a  Patient approves for provider to speak to responsible person post operatively: Yes    Information obtained by:  By signing below, I understand that if any problems occur once I leave the hospital I am to contact [facility].  I understand and acknowledge receipt of the instructions indicated above.

## 2022-12-28 NOTE — PROGRESS NOTES
Physical Therapy  Physical Therapy Treatment Note/Plan of Care    Room #:  0004/1096-83  Patient Name: Debby Liz  YOB: 1955  MRN: [id number]    Date of Service: 12/28/2022     Tentative placement recommendation: Acute rehab  Equipment recommendation: To be determined      Evaluating Physical Therapist: Wero Amaya PT, CHERELLE #[id number]      Specific Provider Orders/Date/Referring Provider :     12/26/22 2200    PT eval and treat  Start:  12/26/22 2200,   End:  12/26/22 2200,   ONE TIME,   Standing Count:  1 Occurrences,   R         Breann Penny DO Acknowledge New     Admitting Diagnosis:   Stroke determined by clinical assessment [facility]) [I63.9]  Cerebrovascular accident (CVA), unspecified mechanism ([facility] [address] [I63.9]      Surgery: none  Visit Diagnoses         Codes    Cerebrovascular accident (CVA), unspecified mechanism ([address]    -  Primary I63.9            Patient Active Problem List   Diagnosis    Stroke determined by clinical assessment ([facility] [address]    Acute CVA (cerebrovascular accident) ([address]        ASSESSMENT of Current Deficits Patient exhibits decreased strength, balance, and endurance impairing functional mobility, transfers, gait , gait distance, and tolerance to activity. Patient is unsteady with standing position, greatest deficits in L LE. Patient amb without shoes and has several LOB with mod assist for correction. When wearing shoes, patient more stable during ambulation, however did have loss of balance with turning, requiring moderate assist for correction. Cues needed throughout for pacing, decreased speed, safety and walker approximation. Patient ambulates with steppage gait and ataxic at times. Patient aware of deficits and education on re-training muscles. Patient performed standing and seated exercises. Due to instability with standing activities, patient would benefit from further rehab to address deficits and decrease risk of falls.         PHYSICAL THERAPY  PLAN OF CARE       Physical therapy plan of care is established based on physician order,  patient diagnosis and clinical assessment    Current Treatment Recommendations:    -Bed Mobility: Lower extremity exercises  and Trunk control activities   -Sitting Balance: Incorporate reaching activities to activate trunk muscles , Hands on support to maintain midline , Facilitate active trunk muscle engagement , Facilitate postural control in all planes , and Engage in core activities to allow for movement within base of support   -Standing Balance: Perform strengthening exercises in standing to promote motor control with or without upper extremity support , Instruct patient on adequate base of support to maintain balance, and Challenge balance utilizing reaching  activities beyond center of gravity    -Transfers: Provide instruction on proper hand and foot position for adequate transfer of weight onto lower extremities and use of gait device if needed, Cues for hand placement, technique and safety.  Provide stabilization to prevent fall , Facilitate weight shift forward on to lower extremities and provide necessary stabilization of bilateral lower extremities , Support transfer of weight on to lower extremities, and Assist with extension of knees trunk and hip to accept weight transfer   -Gait: Gait training, Standing activities to improve: base of support, weight shift, weight bearing , Exercises to improve trunk control, Exercises to improve hip and knee control, Performance of protected weight bearing activities, and Activities to increase weight bearing   -Endurance: Utilize Supervised activities to increase level of endurance to allow for safe functional mobility including transfers and gait  and Use graduated activities to promote good breathing techniques and provide support and education to maximize respiratory function  -Stairs: Stair training with instruction on proper technique and hand placement on rail    PT long term treatment goals are located in below grid    Patient and or family understand(s) diagnosis, prognosis, and plan of care. Frequency of treatments: Patient will be seen  twice daily. Prior Level of Function: Patient ambulated independently   Rehab Potential: good - for baseline    Past medical history:   History reviewed. No pertinent past medical history. History reviewed. No pertinent surgical history. SUBJECTIVE:    Precautions: Up with assistance, falls and balance deficits, multiple LOB during ambulation      Social history: Patient lives alone in a ranch home  with Ramp  to enter Walk in shower  , grab bars      Equipment owned: Cane, Rollator, Electric wheelchair, and Elevated toilet [address]   How much difficulty turning over in bed?: None  How much difficulty sitting down on / standing up from a chair with arms?: A Little  How much difficulty moving from lying on back to sitting on side of bed?: None  How much help from another person moving to and from a bed to a chair?: A Little  How much help from another person needed to walk in hospital room?: A Lot  How much help from another person for climbing 3-5 steps with a railing?: A Lot  AM-PAC Inpatient Mobility Raw Score : 18  AM-PAC Inpatient T-Scale Score : 43.63  Mobility Inpatient CMS 0-100% Score: 46.58  Mobility Inpatient CMS G-Code Modifier : CK    Nursing cleared patient for PT treatment. Patient's daughter present during session. OBJECTIVE;   Initial Evaluation  Date: 12/27/2022 Treatment Date:    12/28/2022   Short Term/ Long Term   Goals   Was pt agreeable to Eval/treatment? Yes yes To be met in 3 days   Pain level   0/10   None reported    Bed Mobility    Rolling: Supervision     Supine to sit: Supervision     Sit to supine: Supervision     Scooting: Supervision    Rolling: Independent   Supine to sit:  Independent   Sit to supine: Not assessed patient seated edge of bed   Scooting: Independent Rolling: Independent    Supine to sit: Independent    Sit to supine: Independent    Scooting: Independent     Transfers Sit to stand: Supervision   Sit to stand: Supervision  cues for hand placement   Sit to stand: Independent    Ambulation     2 x 40 feet using  WW and HHA with Moderate assist of 1   for walker control, walker approximation, balance, upright, weight shift, multiplane instability, and safety 2x30', 2x70 feet using  wheeled walker with Min/Mod assist   for balance, Patient with steppage gait Left and ataxic gait, and cues for upright posture, walker approximation, decreased speed, safety, and pacing  First walk without shoes, second walk with shoes on and inc stability.    > 150 feet using  least restrictive device with Supervision     Stair negotiation: ascended and descended   Not assessed       10 steps, 1 rail with Supervision   ROM Within functional limits    Increase range of motion 10% of affected joints    Strength BUE:  refer to OT eval  RLE:  4+/5  LLE:  4+/5  Increase strength in affected mm groups by 1/3 grade   Balance Sitting EOB:  good-  Dynamic Standing:  fair - Sitting EOB: good   Dynamic Standing: fair - with wheeled walker   Sitting EOB:  good   Dynamic Standing: fair with LRD     Patient is Alert & Oriented x person, place, time, and situation and follows directions    Sensation:  Patient  denies numbness/tingling   Edema:  no   Endurance: fair      Vitals: room air   Blood Pressure at rest  Blood Pressure during session    Heart Rate at rest  Heart Rate during session    SPO2 at rest %  SPO2 during session %     Patient education  Patient educated on role of Physical Therapy, risks of immobility, safety and plan of care, energy conservation,  importance of mobility while in hospital , safety , stair training , and seated and standing exercises      Patient response to education:   Pt verbalized understanding Pt demonstrated skill Pt requires further education in this area   Yes Partial Yes      Treatment:  Patient practiced and was instructed/facilitated in the following treatment: Patient transferred to edge of bed and Sat edge of bed 5 minutes with Supervision  to increase dynamic sitting balance and activity tolerance. Patient stood to amb in hallway as above, 2 losses of balance, and back to bedside. Assisted patient with donning shoes and amb in hallway with inc stability, however still experienced 1 loss of balance with turning. Patient returned to bedside and performed seated and standing exercises. Therapeutic Exercises:  ankle pumps, long arc quad, seated marching, calf raise, and marching  [id number]      At end of session, patient sitting edge of bed with daughter present call light and phone within reach,  all lines and tubes intact, nursing notified. Patient would benefit from continued skilled Physical Therapy to improve functional independence and quality of life.          Patient's/ family goals   rehab    Time in  936  Time out  1003    Total Treatment Time  27 minutes    CPT codes:  Therapeutic activities (60059)   17 minutes  1 unit(s)  Therapeutic exercises (29802)   10 minutes  1 unit(s)    Boris Ruano PTA   #[id number]

## 2022-12-28 NOTE — PROGRESS NOTES
Internal Medicine Progress Note    [facility]    Daria Jimenez. Kermit Lopez., ELPIDIOOVIJAYA. Clara Thomas D.O., SY Urrutia D.O. Karina Hager D.O. Rosalinda Acevedo, MSN, APRN, NP-C  Chilango Bermudez. Moisés Hernandez, MSN, APRN-CNP     Primary Care Physician: Vidal Carmichael MD   Admitting Physician:  Fabricio Dyer DO  Admission date and time: 12/26/2022  7:07 PM    Room:  [address] [place] [address]  Admitting diagnosis: Stroke determined by clinical assessment [facility]) [I63.9]  Cerebrovascular accident (CVA), unspecified mechanism [facility]) [I63.9]    Patient Name: Tiesha Haque  MRN: [id number]    Date of Service: 12/28/2022     Subjective:  Barbi Núñez is a 75 y.o. male who was seen and examined today,12/28/2022, at the bedside. Patient was resting in bed. States he does feel better since coming to the hospital.  Patient does have complaint of feeling unstable when ambulating with a walker. It is feels like he cannot control his legs as well as he could. Feels as though he is going to lose his balance. Patient also states that he was having some issues with speech and swallowing which is improving with speech therapy. Patient states he is also having sinus congestion and postnasal drip. States he has been clearing his throat a lot. Does admit to crusted blood when blowing/picking his nose. In regard to the patient's current issues he does admit with his daughter present that he probably missed approximately 4 days of his aspirin and Plavix. States he just forgets to take his medications. Daughter from [facility] present during my examination. Review of System:   Constitutional:   Denies fever or chills, weight loss or gain, admits to fatigue. HEENT:   Denies ear pain, positive for postnasal drip, sinus congestion, frequent clearing of his throat. Denies eye problems. Cardiovascular:   Denies any chest pain, irregular heartbeats, or palpitations. Respiratory:   Denies shortness of breath, coughing, sputum production, hemoptysis, or wheezing. Gastrointestinal:   Denies nausea, vomiting, diarrhea, or constipation. Denies any abdominal pain. Genitourinary:    Denies any urgency, frequency, hematuria. Voiding  without difficulty. Extremities:   Denies lower extremity swelling, edema or cyanosis. Neurology:    Denies any headache or focal neurological deficits, Denies generalized weakness or memory difficulty. With patient mitts to numbness and tingling of the bilateral lower extremities. Psch:   Denies being anxious or depressed. Musculoskeletal:    Denies  myalgias, joint complaints or back pain. Integumentary:   Denies any rashes, ulcers, or excoriations. Denies bruising. Hematologic/Lymphatic:  Denies bruising or bleeding. Physical Exam:  No intake/output data recorded. Intake/Output Summary (Last 24 hours) at 12/28/2022 0816  Last data filed at 12/27/2022 1349  Gross per 24 hour   Intake 340 ml   Output --   Net 340 ml   I/O last 3 completed shifts: In: 340 [P.O.:340]  Out: -   Patient Vitals for the past 96 hrs (Last 3 readings):   Weight   12/27/22 0845 198 lb 6.4 oz (90 kg)   12/26/22 2208 193 lb (87.5 kg)     Vital Signs:   Blood pressure (!) 142/95, pulse 97, temperature 97.8 °F (36.6 °C), temperature source Oral, resp. rate 18, height 6' (1.829 m), weight 198 lb 6.4 oz (90 kg), SpO2 96 %. General appearance:  Alert, responsive, oriented to person, place, and time. Well preserved, alert, no distress. Head:  Normocephalic. No masses, lesions or tenderness. Eyes:  PERRLA. EOMI. Sclera clear. Buccal mucosa moist.  Impaired vision. Eyeglasses on. ENT:  Ears normal. Mucosa normal.  Neck:    Supple. Trachea midline. No thyromegaly. No JVD. No bruits. Heart:    Rhythm regular. Rate controlled. Systolic murmur present. Normal sinus rhythm on the monitor. Lungs:    Symmetrical. Clear to auscultation bilaterally. No wheezes. No rhonchi. No rales. Abdomen:   Soft. Non-tender. Non-distended. Bowel sounds positive. No organomegaly or masses. No pain on palpation. Extremities:    Peripheral pulses present. No peripheral edema. No ulcers. No cyanosis. No clubbing. Neurologic:    Alert x 3. No focal deficit. Cranial nerves grossly intact. No focal weakness. Psych:   Behavior is normal. Mood appears normal. Speech is not rapid and/or pressured. Musculoskeletal:   Spine ROM normal. Muscular strength intact. Gait not assessed. Integumentary:  No rashes  Skin normal color and texture.   Genitalia/Breast:  Deferred    Medication:  Scheduled Meds:   fluticasone  2 spray Each Nostril Daily    Vitamin D  2,000 Units Oral Daily    insulin glargine  40 Units SubCUTAneous Daily    insulin glargine  20 Units SubCUTAneous Nightly    metoprolol succinate  75 mg Oral Daily    lisinopril  20 mg Oral Daily    aspirin  81 mg Oral Daily    atorvastatin  80 mg Oral Daily    vitamin B-12  1,000 mcg Oral Daily    enoxaparin  40 mg SubCUTAneous Daily    clopidogrel  75 mg Oral Daily    insulin lispro  0-8 Units SubCUTAneous TID WC    insulin lispro  0-4 Units SubCUTAneous Nightly     Continuous Infusions:   dextrose         Objective Data:  Recent Labs     12/26/22 2001 12/27/22  0507 12/28/22  0541   WBC 11.5 9.3 9.5   RBC 5.48 5.64 5.38   HGB 15.9 16.2 15.8   HCT 46.7 48.7 46.0   MCV 85.2 86.3 85.5   MCH 29.0 28.7 29.4   MCHC 34.0 33.3 34.3   RDW 12.4 12.8 12.7    224 234   MPV 11.3 11.6 11.8     Recent Labs     12/26/22 2001 12/26/22 2008 12/27/22  0507 12/28/22  0541     --  137 137   K 4.0  --  4.4 4.5     --  103 104   CO2 22  --  21* 25   BUN 17  --  20 27*   CREATININE 0.9  --  0.9 1.1   GLUCOSE 225* 225 230* 200*   CALCIUM 10.5*  --  10.3* 10.6*   PROT  --   --  6.6 6.7   LABALBU  --   --  4.0 4.1   BILITOT  --   --  1.2 0.9   ALKPHOS  --   --  99 95   AST  --   --  16 14   ALT  --   --  21 18     No results found for: TROPONINI       Wound Documentation:   None    Assessment:  Early subacute stroke involving the left posterior lateral juan miguel, superimposed on cerebrovascular disease with prior CVA  Viral URI with negative work-up thus far  Poorly controlled essential hypertension  Poorly controlled hyperlipidemia  Incidental finding of left maxillary mucosal sinus disease  Poorly controlled Insulin-dependent diabetes mellitus type 2  Noncompliance with medication and treatment regimen leading directly to negative healthcare outcomes    Plan:   Patient is improving slowly. He is to continue to work with speech/occupational/physical therapy. Coordinators assisting with discharge planning. Plan at this time is for patient to go to [facility]  Discussed with the patient and his daughter the need for medication compliance. Patient did admit that he did miss approximately 4 doses of his Plavix and aspirin at home. Discussed medication planners and other means to help remind the patient that he needs to take his medication on a daily basis. Continue monitor glucose levels and treat accordingly. Protonix for GI prophylaxis  Lovenox for VTE prophylaxis  We will add Flonase and [place] nasal spray for sinus complaints. As above incidental finding on MRI did reveal left maxillary mucosal sinus disease. Continue current therapy. See orders for further plan of care. More than 50% of my  time was spent at the bedside counseling/coordinating care with the patient and/or family with face to face contact. This time was spent reviewing notes and laboratory data as well as instructing and counseling the patient. Time I spent with the family or surrogate(s) is included only if the patient was incapable of providing the necessary information or participating in medical decisions. I also discussed the differential diagnosis and all of the proposed management plans with the patient and individuals accompanying the patient.     Georgie Murray, ANASTASIA - CNP,   12/28/2022  8:16 AM

## 2022-12-28 NOTE — PROGRESS NOTES
CLINICAL PHARMACY NOTE: MEDS TO BEDS    Total # of Prescriptions Filled: 2   The following medications were delivered to the patient:  Atorvastatin 80 mg  Metoprolol succinate 25 mg    Additional Documentation:

## 2022-12-28 NOTE — DISCHARGE SUMMARY
Internal Medicine Progress Note     [facility]     Herman Lim. Josy Jacobson, F.A.C.O.I. Virgin Crigler, D.O., MARY ELLEN Gibson, MSN, APRN, NP-C  [place]. Luis Daniel Salazar, MSN, APRN-CNP       Internal Medicine  Discharge Summary    NAME: Dary Bocanegra  :  [birth year]  MRN:  [id number]  Gela Toledo MD  ADMITTED: 2022      DISCHARGED: 22    ADMITTING PHYSICIAN: Jg Cope DO    CONSULTANT(S):   IP CONSULT TO SOCIAL WORK     ADMITTING DIAGNOSIS:   Stroke determined by clinical assessment ([facility] [address] [I63.9]  Cerebrovascular accident (CVA), unspecified mechanism ([facility] [address] [I63.9]     DISCHARGE DIAGNOSES:   Early subacute stroke involving the left posterior lateral juan miguel, superimposed on cerebrovascular disease with prior CVA  Viral URI with negative work-up thus far  Poorly controlled essential hypertension  Poorly controlled hyperlipidemia  Incidental finding of left maxillary mucosal sinus disease  Poorly controlled Insulin-dependent diabetes mellitus type 2  Noncompliance with medication and treatment regimen leading directly to negative healthcare outcomes    BRIEF HISTORY OF PRESENT ILLNESS:   Dary Bocanegra is a 27-year-old male who presented to [address] [place] [address] emergency department last evening with 12 to 24 hours worth of neurologic complaint. States that he had a prior stroke in the past, approximately 7 months ago, with residual left-sided symptoms. He developed numbness and tingling with his prior stroke. With the stroke he has a mild facial sensation changes but had primarily an ataxic gait. Mild headache was also noted. ER work-up returned relatively benign. As he had had some upper respiratory symptoms, rapid COVID and flu testing was obtained and returned negative. CBC was unremarkable. Metabolic panel revealed hyperglycemia but was otherwise unremarkable.   High-sensitivity troponin x2 were mildly elevated but did not trend upward to suggest acute coronary syndrome. Urinalysis unremarkable. Coagulation studies negative. EKG was felt to be nonischemic. CT of the head without contrast returned negative for any acute intracranial hemorrhage or mass-effect. Case was discussed with ER physician, patient was hypertensive and this was felt to be a potential contributor. We discussed admission, stroke management. Patient seen examined bedside today. No new/acute tingling. Ataxic gait persist.  No headache. No other acute neurologic complaints. Positive for prior stroke deficits. No fevers or chills. No known sick contacts or exposures. Improved sinus and nasal congestion. Coughing at times without sputum or hemoptysis. No chest pain or palpitations, fluttering. No abdominal pain, nausea, vomiting or bowel changes, hematochezia or melena. No acute urinary complaints.     LABS[de-identified]  Lab Results   Component Value Date    WBC 9.5 12/28/2022    HGB 15.8 12/28/2022    HCT 46.0 12/28/2022     12/28/2022     12/28/2022    K 4.5 12/28/2022     12/28/2022    CREATININE 1.1 12/28/2022    BUN 27 (H) 12/28/2022    CO2 25 12/28/2022    GLUCOSE 200 (H) 12/28/2022    ALT 18 12/28/2022    AST 14 12/28/2022    INR 1.0 12/26/2022     Lab Results   Component Value Date    INR 1.0 12/26/2022    PROTIME 11.0 12/26/2022      Lab Results   Component Value Date    TSH 1.900 12/27/2022     Lab Results   Component Value Date    TRIG 231 (H) 12/27/2022     Lab Results   Component Value Date    HDL 43 12/27/2022     Lab Results   Component Value Date    LDLCALC 167 (H) 12/27/2022     No results found for: LABA1C    IMAGING:  CT Head W/O Contrast    Result Date: 12/26/2022  EXAMINATION: CT OF THE HEAD WITHOUT CONTRAST  12/26/2022 4:31 pm TECHNIQUE: CT of the head was performed without the administration of intravenous contrast. Automated exposure control, iterative reconstruction, and/or weight based adjustment of the mA/kV was utilized to reduce the radiation dose to as low as reasonably achievable. COMPARISON: CT brain 04/20/2022 HISTORY: ORDERING SYSTEM PROVIDED HISTORY: History of stroke, left-sided headache with left-sided eye pain more than 24 hours ago. Less then 24 hours ago, difficulty ambulating TECHNOLOGIST PROVIDED HISTORY: Reason for exam:->History of stroke, left-sided headache with left-sided eye pain more than 24 hours ago. Less then 24 hours ago, difficulty ambulating Has a \"code stroke\" or \"stroke alert\" been called? ->No Decision Support Exception - unselect if not a suspected or confirmed emergency medical condition->Emergency Medical Condition (MA) FINDINGS: Parenchyma: No evidence of acute intracranial hemorrhage or mass effect. Ventricles: No evidence of hydrocephalus. Calvarium: No acute calvarial fracture is seen. Mild mucosal thickening and small mucous retention cysts in the left maxillary sinus. No acute intracranial hemorrhage or mass effect. MRI BRAIN W WO CONTRAST    Result Date: 12/27/2022  EXAMINATION: MRI OF THE BRAIN WITHOUT AND WITH CONTRAST  12/27/2022 6:54 am TECHNIQUE: Multiplanar multisequence MRI of the head/brain was performed without and with the administration of intravenous contrast. COMPARISON: CT on 12/26/2022. HISTORY: ORDERING SYSTEM PROVIDED HISTORY: stroke TECHNOLOGIST PROVIDED HISTORY: Reason for exam:->stroke FINDINGS: INTRACRANIAL STRUCTURES/VENTRICLES:  There are areas of increased diffusion signal noted along the left posterolateral medulla with associated increased T2 signal, compatible with an early subacute infarct. There are no other areas of restricted diffusion. The cerebral and cerebellar parenchyma demonstrate volume loss. Scattered areas of increased T2 signal are noted supra and infratentorially, compatible with mild chronic microvascular white matter ischemic disease. There are no abnormal extra-axial fluid collections. The ventricles are normal in size. Normal major intracranial flow voids are noted. No areas of abnormal enhancement are noted in the cerebral or cerebellar parenchyma to suggest an underlying mass. There is a probable small developmental venous anomaly along the lateral aspect of the left precentral gyrus. There are no areas of blooming artifact noted on the gradient echo sequences to suggest sequela of acute or chronic hemorrhage. The hippocampal formations are symmetric in size and signal intensity. ORBITS: The visualized portion of the orbits demonstrate no acute abnormality. SINUSES: There is scattered paranasal sinus disease with left maxillary mucous retention cysts. There is a trace left mastoid effusion. The right mastoid air cells are clear. BONES/SOFT TISSUES: The bone marrow signal intensity and craniocervical junction are unremarkable. The pituitary gland is normal in appearance. 1. Early subacute infarct along the left posterolateral juan miguel. 2. Cerebral parenchymal volume loss with mild chronic microvascular white matter ischemic disease. 3. Trace left mastoid effusion. 4. Asymmetric left maxillary mucosal sinus disease. US CAROTID ARTERY BILATERAL    Result Date: 12/26/2022  EXAMINATION: ULTRASOUND EVALUATION OF THE CAROTID ARTERIES 12/26/2022 TECHNIQUE: Duplex ultrasound using B-mode/gray scaled imaging, Doppler spectral analysis and color flow Doppler was obtained of the carotid arteries. COMPARISON: None. HISTORY: ORDERING SYSTEM PROVIDED HISTORY: stroke work up TECHNOLOGIST PROVIDED HISTORY: Reason for exam:->stroke work up What reading provider will be dictating this exam?->CRC FINDINGS: RIGHT: The right common carotid artery demonstrates peak systolic velocities of 34.6 and 112.9 cm/sec in the proximal and distal segments respectively. The right internal carotid artery demonstrates the systolic velocities of 58.3 and 71.1 cm/sec in the proximal, mid and distal segments respectively. The external carotid artery is patent. The vertebral artery demonstrates normal antegrade flow. There is mild-to-moderate plaque formation present. ICA/CCA ratio of 0.6 LEFT: The left common carotid artery demonstrates peak systolic velocities of 002.1 and 120.3 cm/sec in the proximal and distal segments respectively. The left internal carotid artery demonstrates the systolic velocities of 41.3 and 63.7 cm/sec in the proximal, mid and distal segments respectively. The external carotid artery is patent. The vertebral artery demonstrates normal antegrade flow. There is mild-to-moderate plaque formation present. ICA/CCA ratio of 0.6     The right internal carotid artery demonstrates 0-50% stenosis. The left internal carotid artery demonstrates 0-50% stenosis. Bilateral vertebral arteries are patent with flow in the normal direction. HOSPITAL COURSE:   Barbi Núñez did well throughout the hospitalization. He was found to have suffered acute CVA in the setting of outpatient noncompliance with his previous stroke medications. Stroke medications have been maximized. We reinforced the need for compliance. He will continue working with the therapy teams and recommendations are for acute rehabilitation. This has been arranged accordingly and he is acceptable for discharge. Patient is medically stable and acceptable for discharge today. BRIEF PHYSICAL EXAMINATION AND LABORATORIES ON DAY OF DISCHARGE:  VITALS:  BP (!) 142/95   Pulse 97   Temp 97.8 °F (36.6 °C) (Oral)   Resp 18   Ht 6' (1.829 m)   Wt 198 lb 6.4 oz (90 kg)   SpO2 96%   BMI 26.91 kg/m²     HEENT:  PERRLA. EOMI. Sclera clear. Buccal mucosa moist.    Neck:  Supple. Trachea midline. No thyromegaly. No JVD. No bruits. Heart:  Rhythm regular, rate controlled. No murmurs. Lungs:  Symmetrical. Clear to auscultation bilaterally. No wheezes. No rhonchi. No rales. Abdomen: Soft. Non-tender. Non-distended. Bowel sounds positive. No organomegaly or masses.   No pain on palpation    Extremities:  Peripheral pulses present. No peripheral edema. No ulcers. Neurologic:  Alert x 3. Unsteady gait with some mild expressive aphasia. .    Skin:  No petechia. No hemorrhage. No wounds. DISPOSITION:  The patient's condition is stable. At this time the patient is without objective evidence of an acute process requiring continuing hospitalization or inpatient management. They are stable for discharge with outpatient follow-up. I have spoken with the patient and discussed the results of the current hospitalization, in addition to providing specific details for the plan of care and counseling regarding the diagnosis and prognosis. The plan has been discussed in detail and they are aware of the specific conditions for emergent return, as well as the importance of follow-up. Their questions are answered at this time and they are agreeable with the plan for discharge to [facility].     DISCHARGE MEDICATIONS:   Current Discharge Medication List             Details   sodium chloride (OCEAN, BABY AYR) 0.65 % nasal spray 1 spray by Nasal route as needed for Congestion  Refills: 0                Details   atorvastatin (LIPITOR) 80 MG tablet Take 1 tablet by mouth daily  Qty: 30 tablet, Refills: 3      metoprolol succinate (TOPROL XL) 25 MG extended release tablet Take 3 tablets by mouth daily  Qty: 30 tablet, Refills: 3                Details   Cholecalciferol (VITAMIN D3) 50 MCG (2000 UT) CAPS Take 2,000 Units by mouth Daily      ezetimibe (ZETIA) 10 MG tablet Take 10 mg by mouth daily      repaglinide (PRANDIN) 2 MG tablet Take 2 mg by mouth 3 times daily (before meals)      clopidogrel (PLAVIX) 75 MG tablet Take 75 mg by mouth daily      lisinopril (PRINIVIL;ZESTRIL) 10 MG tablet Take 20 mg by mouth daily      dapagliflozin (FARXIGA) 5 MG tablet Take 10 mg by mouth every morning      metFORMIN (GLUCOPHAGE-XR) 500 MG extended release tablet Take 2,000 mg by mouth daily Patient's bottle is 500mg tablets, says take 4 tablets by mouth once daily      Insulin Glargine, 2 Unit Dial, (TOUJEO MAX SOLOSTAR) 300 UNIT/ML SOPN Inject 45 Units into the skin daily      vitamin B-12 (CYANOCOBALAMIN) 1000 MCG tablet Take 1,000 mcg by mouth daily      aspirin 81 MG EC tablet Take 81 mg by mouth daily             FOLLOW UP/INSTRUCTIONS:  This patient is instructed to follow-up with his primary care physician. Patient is instructed to follow-up with the consults listed above as directed by them. he is instructed to resume home medications and take new medications as indicated in the list above. If the patient has a recurrence of symptoms, he is instructed to go to the ED. Preparing for this patient's discharge, including paperwork, orders, instructions, and meeting with patient did require > 40 minutes.     Salo Seals DO  7:40 AM  12/29/2022

## 2022-12-28 NOTE — PROGRESS NOTES
Speech Language Pathology      NAME:  Anuradha Manuel  :  1955  DATE: 2022  ROOM:  [facility]/8578-23    Patient seen for dysphagia therapy 15 minutes. Meal tray present. Persistent moist cough frequently present. Patient reports frequent nasal congestion/sinus drainage however the increased mucous was closely related to when he would take a drink. He reports having to be careful when swallowing since his last stroke in May. Requested MBSS discussed with nursing.      Stroke determined by clinical assessment [facility]) [I63.9]  Cerebrovascular accident (CVA), unspecified mechanism ([facility] [address] [I63.9]    [id number]  dysphagia tx    Radha Recinos MSCCC/SLP  Speech Language Pathologist  [id number]

## 2022-12-28 NOTE — PROGRESS NOTES
SPEECH/LANGUAGE PATHOLOGY  VIDEOFLUOROSCOPIC STUDY OF SWALLOWING (MBS)   and PLAN OF CARE    PATIENT NAME:  Gricelda Eason  (male)     MRN:  [id number]    :  1955  (75 y.o.)  STATUS:  Inpatient: Room 0421/0421-01    TODAY'S DATE:  2022  REFERRING PROVIDER:   Dr. Opal Figueroa: SLP video swallow  Date of order:  2022   REASON FOR REFERRAL: dysphagia    EVALUATING THERAPIST: AISHA Dangelo      RESULTS:      DYSPHAGIA DIAGNOSIS:  moderate oropharyngeal phase dysphagia     DIET RECOMMENDATIONS:  Regular consistency solids (IDDSI level 7) with  thin liquids (IDDSI level 0)    FEEDING RECOMMENDATIONS:    Assistance level:  Encourage self-feeding     Compensatory strategies recommended: Multiple swallow, Effortful swallow, Chin neutral to slightly down , Small bites/sips, Alternate solids and liquids, Throat clear, and Head TURN to right or left      Discussed recommendations with nursing and/or faxed report to referring provider: Yes    Laryngeal Penetration and Aspiration:  Penetration WITHOUT aspiration was observed in today's study with  thin liquid    SPEECH THERAPY  PLAN OF CARE   The dysphagia POC is established based on physician order and dysphagia diagnosis    Skilled SLP intervention for dysphagia management up to 5x per week until goals met, pt plateaus in function and/or discharged from hospital  Anticipate Patient will benefit from ongoing intensive speech therapy at discharge due to degree of deficits       Conditions Requiring Skilled Therapeutic Intervention for dysphagia:    Reduced pharyngeal clearing of the bolus  Reduced laryngeal closure resulting in penetration  Swallow triggered when bolus head at level of pyriform sinus increasing risk of aspiration    SPECIFIC DYSPHAGIA INTERVENTIONS TO INCLUDE:     Training in positioning for improved integrity of swallow  Compensatory strategy training   Therapeutic exercises  Trials of upgraded diet/liquid     Specific instructions for next treatment:  development and training of compensatory swallow strategies to improve airway protection and swallow function  Treatment Goals:    Short Term Goals:  Pt will implement identified compensatory swallowing strategies on 90% of opportunities or greater to improve airway protection and swallow function. Pt will participate in ongoing mealtime assessment to provide diet modification and compensatory strategy implementation to minimize risk of aspiration associated with PO intake  Pt will complete BOTR strength/ ROM exercises to reduce pharyngeal residuals and improve epiglottic inversion minimal verbal prompts  Pt will complete laryngeal strength/ ROM therapeutic exercises to improve airway protection for the least restrictive PO diet minimal verbal prompts  Pt will complete Effortful Swallow therapeutically to target increased oral and base of tongue pressure, increased pharyngeal constrictor contractions, and increased UES relaxation duration to reduce pharyngeal residue with minimal verbal prompts   Pt will participate in DPNS to address functional deficits identified during swallow evaluation    Long Term Goals:   Pt will improve oropharyngeal swallow function to ensure airway protection during PO intake to maintain adequate nutrition/hydration and decrease signs/symptoms of aspiration to less than 1 x/day.       Patient/family Goal:    To eat/drink without coughing or choking    Plan of care discussed with Patient and Family   The Patient and Family understand(s) the diagnosis, prognosis and plan of care     Rehabilitation Potential/Prognosis: good                      ADMITTING DIAGNOSIS: Stroke determined by clinical assessment ([facility] [address] [I63.9]  Cerebrovascular accident (CVA), unspecified mechanism ([address] [I63.9]     VISIT DIAGNOSIS:   Visit Diagnoses         Codes    Cerebrovascular accident (CVA), unspecified mechanism ([address]    -  Primary I63.9                PATIENT REPORT/COMPLAINT: coughing with all consistencies which has gotten worse with recent hospital admission     PRIOR LEVEL OF SWALLOW FUNCTION:    Past History of Dysphagia?:  yes    Home diet: Regular consistency solids (IDDSI level 7) with  thin liquids (IDDSI level 0)  Current Diet Order:  ADULT DIET; Dysphagia - Soft and Bite Sized; No Drinking Straws    PROCEDURE:  Consistencies Administered During the Evaluation   Liquids: thin liquid, nectar thick liquid, and honey thick liquid   Solids:  pureed foods and solid foods      Method of Intake:   cup, spoon  Self fed      Position:   Seated, upright, Lateral plane    INSTRUMENTAL ASSESSMENT:      MBSImP Results:   Lip closure for intraoral bolus containment resulted in no labial escape. Tongue control during bolus hold maintained a cohesive bolus held between tongue to palate seal. Bolus preparation and mastication resulted in timely and efficient chewing and mashing. Bolus transport/lingual motion was with brisk tongue motion. Oral residue was not observed. There was complete oral clearance. Initiation of the pharyngeal swallow occurred as the bolus head reached the pyriform sinuses. Soft palate elevation resulted in no bolus between the soft palate and the pharyngeal wall. Laryngeal elevation demonstrated partial superior movement of the thyroid cartilage with partial approximation of the arytenoids to the epiglottic petiole. Anterior hyoid excursion demonstrated partial anterior movement. Epiglottic movement resulted in partial inversion. Laryngeal vestibule closure was incomplete, as indicated by a narrow column of air or contrast within the laryngeal vestibule at the height of the swallow. Pharyngeal stripping wave was present but diminished. Pharyngeal contraction could not be determined due to logistical reasons not related to physiologic impairment.      Pharyngoesophageal segment opening was completely distended for incomplete duration with partial obstruction of bolus flow. Tongue base retraction allowed a wide collection of residue between the retracted tongue base and the posterior pharyngeal wall. A moderate collection of residue remained within or on the pharyngeal structures with all consistencies and most notable with thicker consistencies,  the residue was more prominent on the left. Esophageal clearance in the upright position could not be assessed due to logistical reasons not related to physiologic impairment. PENETRATION-ASPIRATION SCALE (PAS):  THIN 2 = Material enters the airway, remains above vocal folds, and is ejected from the airway   MILDLY THICK 1 = Material does not enter the airway  MODERATELY THICK 1 = Material does not enter the airway  PUREE 1 = Material does not enter the airway  HARD SOLID 1 = Material does not enter the airway       COMPENSATORY STRATEGIES    Compensatory strategies that were beneficial included Multiple swallow, Effortful swallow, Small bites/sips, and Alternate solids and liquids      STRUCTURAL/FUNCTIONAL ANOMALIES   No structural/functional anomalies were noted    CERVICAL ESOPHAGEAL STAGE :     The cervical esophagus appeared adequate          ___________    Cognition:   Within functional limits for this exam    Oral Peripheral Examination   Adequate lingual/labial strength     Current Respiratory Status   room air     Parameters of Speech Production  Respiration:  Adequate for speech production  Quality:   Within functional limits  Intensity: Within functional limits    Pain: No pain reported. EDUCATION:   The Speech Language Pathologist (SLP) completed education regarding results of evaluation and that intervention is warranted at this time. Learner: Patient and Family  Education: Reviewed results and recommendations of this evaluation  Evaluation of Education:  Faith Ing understanding    This plan may be re-evaluated and revised as warranted.         Evaluation Time includes thorough review of current medical information, gathering information on past medical history/social history and prior level of function, completion of standardized testing/informal observation of tasks, assessment of data and education on plan of care and goals. [x]The admitting diagnosis and active problem list, have been reviewed prior to initiation of this evaluation. CPT Code: 23011  dysphagia study    ACTIVE PROBLEM LIST:   Patient Active Problem List   Diagnosis    Stroke determined by clinical assessment ([facility] [address]    Acute CVA (cerebrovascular accident) [facility])         Patient seen for swallow therapy 10 minutes. Reviewed current solid/liquid consistency diet recommendation for   Regular consistency solids (IDDSI level 7) with thin liquids (IDDSI level 0)and discussed compensatory strategies to ensure safe PO intake. Reviewed aspiration precautions. Discussed use of Multiple swallow, Chin neutral to slightly down , Small bites/sips, Alternate solids and liquids, and Head TURN to left or right to decrease risk of aspiration with implementation of strengthening exercises to improve swallow function as the long term goal.  Patient was able to return demonstration of compensatory strategies during session. .  will continue POC.        17161  dysphagia tx    Radha Recinos MSCCC/SLP  Speech Language Pathologist  [id number]

## 2022-12-28 NOTE — PROGRESS NOTES
[address] [facility]  [facility] [place]. [place]        Date:2022                                                  Patient Name: Yuridia An    MRN: [id number]    : 1955    Room: [address] [place] [address]     Evaluating OT: Naida Herrera OTR/L #[id number]     Referring Provider and Specific Provider Orders/Date:      22  OT eval and treat  Start:  22,   End:  22,   ONE TIME,   Standing Count:  1 Occurrences,   R         Radha Mo, DO      Placement Recommendation:  Acute Rehab       Diagnosis:   1. Cerebrovascular accident (CVA), unspecified mechanism [facility])         Surgery: None       Pertinent Medical History:     History reviewed. No pertinent past medical history. History reviewed. No pertinent surgical history. Precautions:  Fall Risk, up as tolerated, hx of CVA May 2022     MRI Brain W WO Contrast 22:  1. Early subacute infarct along the left posterolateral juan miguel. 2. Cerebral parenchymal volume loss with mild chronic microvascular white   matter ischemic disease. 3. Trace left mastoid effusion. 4. Asymmetric left maxillary mucosal sinus disease.       Assessment of current deficits    [x] Functional mobility  [x]ADLs  [x] Strength               []Cognition    [x] Functional transfers   [x] IADLs         [x] Safety Awareness   [x]Endurance    [] Fine Coordination              [x] Balance      [] Vision/perception   []Sensation     []Gross Motor Coordination  [] ROM  [] Delirium                   [] Motor Control     OT PLAN OF CARE   OT POC based on physician orders, patient diagnosis and results of clinical assessment    Frequency/Duration 2-5 days/wk for 2 weeks BID PRN     Specific OT Treatment Interventions to include:   * Instruction/training on adapted ADL techniques and AE recommendations to increase functional independence within precautions       * Training on energy conservation strategies, correct breathing pattern and techniques to improve independence/tolerance for self-care routine  * Functional transfer/mobility training/DME recommendations for increased independence, safety, and fall prevention  * Patient/Family education to increase follow through with safety techniques and functional independence  * Recommendation of environmental modifications for increased safety with functional transfers/mobility and ADLs  * Therapeutic exercise to improve motor endurance, ROM, and functional strength for ADLs/functional transfers  * Therapeutic activities to facilitate/challenge dynamic balance, stand tolerance for increased safety and independence with ADLs  * Therapeutic activities to facilitate gross/fine motor skills for increased independence with ADLs  * Neuro-muscular re-education: facilitation of righting/equilibrium reactions, midline orientation, scapular stability/mobility, normalization of muscle tone, and facilitation of volitional active controled movement    Recommended Adaptive Equipment: TBD at rehab      Home Living: Patient lives alone in a ranch home  with Ramp  to enter Walk in shower  , grab bars       Equipment owned: [address], [address], [address], Electric wheelchair, and Elevated toilet seat     Prior Level of Function: Independent with ADLs , Independent with IADLs; ambulated independently. Driving: Yes   Occupation: Working FT as a manager      Pain Level: Pt denied pain; Nursing notified.       Cognition: A&O: 4/4; Follows 3 step directions   Memory: intact   Sequencing: intact   Problem solving: fair    Judgement/safety: fair     [facility]   AM-PAC Daily Activity Inpatient   How much help for putting on and taking off regular lower body clothing?: A Little  How much help for Bathing?: A Lot  How much help for Toileting?: A Little  How much help for putting on and taking off regular upper body clothing?: A Little  How much help for taking care of personal grooming?: A Little  How much help for eating meals?: A Little  AM-PAC Inpatient Daily Activity Raw Score: 17  AM-PAC Inpatient ADL T-Scale Score : 37.26  ADL Inpatient CMS 0-100% Score: 50.11  ADL Inpatient CMS G-Code Modifier : CK     Functional Assessment:    Initial Eval Status  Date: 12/28/22   Treatment Status  Date: STGs = LTGs  Time frame: 10-14 days   Feeding Supervision     Independent    Grooming Minimal Assist   To maintain balance and safety standing at sink for hand hygiene. Moderate [place]    UB Dressing Minimal Assist    Moderate [place]    LB Dressing Minimal Assist   To maintain balance support and safety while doffing/donning pants over hips standing at commode. SBA to doff/don B socks seated at EOB. Moderate [place]    Bathing Moderate Assist   Requires assist for safety and functional transfers. Moderate [place]    Toileting Minimal Assist   For transfer on/off commode using grab bar. SBA for rear hygiene while seated on commode. Moderate [place]    Bed Mobility  Supine to sit: Supervision   Sit to supine:  Not Assessed; at EOB    Supine to sit: Independent   Sit to supine: Independent    Functional Transfers Sit to stand: Minimal Assist   Stand to sit: Minimal Assist   Commode Transfer: Minimal Assist     Transfer training with verbal cues for hand placement throughout session to improve safety. Independent   Functional Mobility Minimal Assist with wheeled walker to improve balance to/from bathroom, verbal cues for walker sequence and safety. Moderate [place] with use of wheeled walker    Balance Sitting:     Static: fair plus    Dynamic: fair plus  Standing: fair/fair minus using wheeled walker with occasional episodes of loss of balance, especially while turning or reaching outside base of support. Min/Mod A to correct balance and prevent fall.      Sitting:     Static: good    Dynamic: good  Standing: good with walker    Activity Tolerance fair  plus/good; pt is motivated   Increase standing tolerance >5 minutes for improved engagement with functional transfers and indep in ADLs     Visual/  Perceptual Glasses: Yes     Reports changes in vision since admission: No ; visual screen intact      NA      Hand Dominance: Right      AROM (PROM) Strength Additional Info:  Goal:   RUE  WFL 4-/5 good  and wfl FMC/dexterity noted during ADL tasks   Improve overall RUE strength  for participation in functional tasks   LUE WFL 4-/5 good  and wfl FMC/dexterity noted during ADL tasks   Improve overall LUE strength  for participation in functional tasks     Hearing: [facility]   Sensation: Pt admits to chronic right-sided facial tingling from previous stroke and acute left -sided facial tingling   Tone: WFL   Edema: None      Vitals:  HR at rest: 91 bpm HR with activity:  bpm HR at end of session:  bpm   SpO2 at rest: 94% SpO2 with activity: % SpO2 at end of session: 96%   BP at rest:  BP with activity:  BP at end of session:      Comments: RN cleared patient for OT. Upon arrival patient in supine. Therapist facilitated and instructed pt on adapted  techniques & compensatory strategies to improve safety and independence with basic ADLs, bed mobility, functional transfers and mobility to allow pt to achieve highest level of independence and safely. Pt demonstrated good understanding of education & follow through. At end of session, patient was at [facility], RN in room, with call light and phone within reach, all lines and tubes intact. Overall, patient demonstrated  decreased independence and safety during completion of ADL tasks. Pt would benefit from continued skilled OT to increase safety and independence with completion of ADL tasks and functional mobility for improved quality of life. Treatment: OT treatment provided this date includes:   Instruction, education and training on safe facilitation and adapted techniques for completion of ADLs. These include safe functional transfer techniques and energy conservation for completion of ADLs. Education provided on hand/feet placement with walker and body mechanics for fall prevention. Extended time to complete all tasks, including skilled monitoring of patient's response during treatment session and vital signs. Prior to and at the end of session, environmental modifications / line management completed for patients safety and efficiency of treatment session. See above for further details. Rehab Potential: Good for established goals. Patient / Family Goal: Pt in agreement with POC       Patient and/or family were instructed on functional diagnosis, prognosis/goals and OT plan of care. Demonstrated good understanding. Eval Complexity: Low    Time In: 8:25 AM   Time Out: 8:55 AM    Total Treatment Time: 10       Min Units   OT Eval Low 97165  X  1    OT Eval Medium 39144      OT Eval High 90385      OT Re-Eval [id number]            ADL/Self Care 79236     Therapeutic Activities 35266  10 1   Therapeutic Ex 58473       Orthotic Management 70006       Manual 89691     Neuro Re-Ed 00032       Non-Billable Time        Evaluation Time additionally includes thorough review of current medical information, gathering information on past medical history/social history and prior level of function, interpretation of standardized testing/informal observation of tasks, assessment of data and development of plan of care and goals.         Evaluating OT: Naida Herrera OTR/L #[id number]

## 2022-12-28 NOTE — DISCHARGE INSTR - COC
Continuity of Care Form    Patient Name: Danilo Ortiz   :    MRN:  [id number]    Admit date:  2022  Discharge date:  2022    Code Status Order: Full Code   Advance Directives:     Admitting Physician:  Maverick Colon DO  PCP: Divine Melgoza MD    Discharging Nurse: [facility] Unit/Room#: 4845/3254-45  Discharging Unit Phone Number: [phone]    Emergency Contact:   Extended Emergency Contact Information  Primary Emergency Contact: lennox marquez  Home Phone: [phone]  Mobile Phone: [phone]  Relation: Child  Preferred language: English   needed? No    Past Surgical History:  History reviewed. No pertinent surgical history.     Immunization History:   Immunization History   Administered Date(s) Administered    COVID-19, PFIZER PURPLE top, DILUTE for use, (age 15 y+), 30mcg/0.3mL 2021, 2021, 2021       Active Problems:  Patient Active Problem List   Diagnosis Code    Stroke determined by clinical assessment ([facility] [address] I63.9    Acute CVA (cerebrovascular accident) ([facility] [address] I63.9       Isolation/Infection:   Isolation            No Isolation          Patient Infection Status       Infection Onset Added Last Indicated Last Indicated By Review Planned Expiration Resolved Resolved By    None active    Resolved    COVID-19 (Rule Out) 22 COVID-19, Rapid (Ordered)   22 Rule-Out Test Resulted            Nurse Assessment:  Last Vital Signs: BP (!) 142/95   Pulse 97   Temp 97.8 °F (36.6 °C) (Oral)   Resp 18   Ht 6' (1.829 m)   Wt 198 lb 6.4 oz (90 kg)   SpO2 96%   BMI 26.91 kg/m²     Last documented pain score (0-10 scale):    Last Weight:   Wt Readings from Last 1 Encounters:   22 198 lb 6.4 oz (90 kg)     Mental Status:  oriented and alert    IV Access:  - None    Nursing Mobility/ADLs:  Walking   Assisted  Transfer  Assisted  Bathing  Independent  Dressing  Independent  Toileting  Assisted  Feeding Independent  Med Admin  Dependent  Med Delivery   whole    Wound Care Documentation and Therapy:        Elimination:  Continence: Bowel: Yes  Bladder: Yes  Urinary Catheter: None   Colostomy/Ileostomy/Ileal Conduit: No       Date of Last BM: ***    Intake/Output Summary (Last 24 hours) at 12/28/2022 1059  Last data filed at 12/27/2022 1349  Gross per 24 hour   Intake 340 ml   Output --   Net 340 ml     I/O last 3 completed shifts: In: 340 [P.O.:340]  Out: -     Safety Concerns: At Risk for Falls    Impairments/Disabilities:      None    Nutrition Therapy:  Current Nutrition Therapy:   Diet. Diabetic    Routes of Feeding: Oral  Liquids: Thin Liquids  Daily Fluid Restriction: no  Last Modified Barium Swallow with Video (Video Swallowing Test): not done    Treatments at the Time of Hospital Discharge:   Respiratory Treatments: ***  Oxygen Therapy:  is not on home oxygen therapy. Ventilator:    - No ventilator support    Rehab Therapies: Physical Therapy, Occupational Therapy, and Speech/Language Therapy  Weight Bearing Status/Restrictions: No weight bearing restrictions  Other Medical Equipment (for information only, NOT a DME order):  walker  Other Treatments: ***    Patient's personal belongings (please select all that are sent with patient):  {P DME Belongings:[id number]}    RN SIGNATURE:  {Esignature:[id number]}    CASE MANAGEMENT/SOCIAL WORK SECTION    Inpatient Status Date: ***    Readmission Risk Assessment Score:  Readmission Risk              Risk of Unplanned Readmission:  14           Discharging to Facility/ Agency   Name: [facility]: [phone]  FAX: [phone]      Dialysis Facility (if applicable)   Name:  Address:  Dialysis Schedule:  Phone:  Fax:    / signature: Electronically signed by Marcie Barnes on 12/29/22 at 11:48 AM EST    PHYSICIAN SECTION    Prognosis: Good    Condition at Discharge: Stable    Rehab Potential (if transferring to Rehab): Good    Recommended Labs or Other Treatments After Discharge: ***    Physician Certification: I certify the above information and transfer of Rm Patel  is necessary for the continuing treatment of the diagnosis listed and that he requires {Admit to Appropriate Level of Care:36122} for greater 30 days.      Update Admission H&P: {CHP DME Changes in [facility]:[id number]}    PHYSICIAN SIGNATURE:  Electronically signed by Arnol Roy DO on 12/28/22 at 11:46 AM EST

## 2022-12-29 VITALS
SYSTOLIC BLOOD PRESSURE: 131 MMHG | WEIGHT: 198.4 LBS | BODY MASS INDEX: 26.87 KG/M2 | OXYGEN SATURATION: 98 % | TEMPERATURE: 97 F | HEIGHT: 72 IN | DIASTOLIC BLOOD PRESSURE: 92 MMHG | HEART RATE: 98 BPM | RESPIRATION RATE: 18 BRPM

## 2022-12-29 LAB
ALBUMIN SERPL-MCNC: 3.9 G/DL (ref 3.5–5.2)
ALP BLD-CCNC: 92 U/L (ref 40–129)
ALT SERPL-CCNC: 17 U/L (ref 0–40)
ANION GAP SERPL CALCULATED.3IONS-SCNC: 11 MMOL/L (ref 7–16)
AST SERPL-CCNC: 14 U/L (ref 0–39)
BASOPHILS ABSOLUTE: 0.05 E9/L (ref 0–0.2)
BASOPHILS RELATIVE PERCENT: 0.6 % (ref 0–2)
BILIRUB SERPL-MCNC: 0.8 MG/DL (ref 0–1.2)
BILIRUBIN DIRECT: <0.2 MG/DL (ref 0–0.3)
BILIRUBIN, INDIRECT: NORMAL MG/DL (ref 0–1)
BUN BLDV-MCNC: 30 MG/DL (ref 6–23)
CALCIUM SERPL-MCNC: 10.1 MG/DL (ref 8.6–10.2)
CHLORIDE BLD-SCNC: 105 MMOL/L (ref 98–107)
CO2: 23 MMOL/L (ref 22–29)
CREAT SERPL-MCNC: 0.9 MG/DL (ref 0.7–1.2)
EOSINOPHILS ABSOLUTE: 0.33 E9/L (ref 0.05–0.5)
EOSINOPHILS RELATIVE PERCENT: 3.9 % (ref 0–6)
GFR SERPL CREATININE-BSD FRML MDRD: >60 ML/MIN/1.73
GLUCOSE BLD-MCNC: 158 MG/DL (ref 74–99)
HCT VFR BLD CALC: 47.1 % (ref 37–54)
HEMOGLOBIN: 16.1 G/DL (ref 12.5–16.5)
IMMATURE GRANULOCYTES #: 0.03 E9/L
IMMATURE GRANULOCYTES %: 0.4 % (ref 0–5)
INFLUENZA A: NOT DETECTED
INFLUENZA B: NOT DETECTED
LYMPHOCYTES ABSOLUTE: 2.67 E9/L (ref 1.5–4)
LYMPHOCYTES RELATIVE PERCENT: 31.4 % (ref 20–42)
MAGNESIUM: 2.3 MG/DL (ref 1.6–2.6)
MCH RBC QN AUTO: 29.1 PG (ref 26–35)
MCHC RBC AUTO-ENTMCNC: 34.2 % (ref 32–34.5)
MCV RBC AUTO: 85 FL (ref 80–99.9)
METER GLUCOSE: 143 MG/DL (ref 74–99)
METER GLUCOSE: 186 MG/DL (ref 74–99)
MONOCYTES ABSOLUTE: 0.66 E9/L (ref 0.1–0.95)
MONOCYTES RELATIVE PERCENT: 7.8 % (ref 2–12)
NEUTROPHILS ABSOLUTE: 4.75 E9/L (ref 1.8–7.3)
NEUTROPHILS RELATIVE PERCENT: 55.9 % (ref 43–80)
PDW BLD-RTO: 12.5 FL (ref 11.5–15)
PHOSPHORUS: 3.4 MG/DL (ref 2.5–4.5)
PLATELET # BLD: 244 E9/L (ref 130–450)
PMV BLD AUTO: 11.6 FL (ref 7–12)
POTASSIUM SERPL-SCNC: 3.7 MMOL/L (ref 3.5–5)
RBC # BLD: 5.54 E12/L (ref 3.8–5.8)
SARS-COV-2 RNA, RT PCR: NOT DETECTED
SODIUM BLD-SCNC: 139 MMOL/L (ref 132–146)
TOTAL PROTEIN: 6.5 G/DL (ref 6.4–8.3)
WBC # BLD: 8.5 E9/L (ref 4.5–11.5)

## 2022-12-29 PROCEDURE — 82962 GLUCOSE BLOOD TEST: CPT

## 2022-12-29 PROCEDURE — 36415 COLL VENOUS BLD VENIPUNCTURE: CPT

## 2022-12-29 PROCEDURE — 83735 ASSAY OF MAGNESIUM: CPT

## 2022-12-29 PROCEDURE — 6370000000 HC RX 637 (ALT 250 FOR IP): Performed by: INTERNAL MEDICINE

## 2022-12-29 PROCEDURE — 80048 BASIC METABOLIC PNL TOTAL CA: CPT

## 2022-12-29 PROCEDURE — 84100 ASSAY OF PHOSPHORUS: CPT

## 2022-12-29 PROCEDURE — 6360000002 HC RX W HCPCS: Performed by: INTERNAL MEDICINE

## 2022-12-29 PROCEDURE — 97110 THERAPEUTIC EXERCISES: CPT

## 2022-12-29 PROCEDURE — 85025 COMPLETE CBC W/AUTO DIFF WBC: CPT

## 2022-12-29 PROCEDURE — 6370000000 HC RX 637 (ALT 250 FOR IP): Performed by: NURSE PRACTITIONER

## 2022-12-29 PROCEDURE — 97530 THERAPEUTIC ACTIVITIES: CPT

## 2022-12-29 PROCEDURE — 92526 ORAL FUNCTION THERAPY: CPT | Performed by: SPEECH-LANGUAGE PATHOLOGIST

## 2022-12-29 PROCEDURE — 87636 SARSCOV2 & INF A&B AMP PRB: CPT

## 2022-12-29 PROCEDURE — 97535 SELF CARE MNGMENT TRAINING: CPT

## 2022-12-29 PROCEDURE — 80076 HEPATIC FUNCTION PANEL: CPT

## 2022-12-29 RX ADMIN — INSULIN GLARGINE 40 UNITS: 100 INJECTION, SOLUTION SUBCUTANEOUS at 08:32

## 2022-12-29 RX ADMIN — FLUTICASONE PROPIONATE 2 SPRAY: 50 SPRAY, METERED NASAL at 08:31

## 2022-12-29 RX ADMIN — CLOPIDOGREL BISULFATE 75 MG: 75 TABLET ORAL at 08:28

## 2022-12-29 RX ADMIN — ASPIRIN 81 MG: 81 TABLET, COATED ORAL at 08:29

## 2022-12-29 RX ADMIN — METOPROLOL SUCCINATE 75 MG: 50 TABLET, EXTENDED RELEASE ORAL at 08:29

## 2022-12-29 RX ADMIN — CYANOCOBALAMIN TAB 1000 MCG 1000 MCG: 1000 TAB at 08:28

## 2022-12-29 RX ADMIN — Medication 2000 UNITS: at 08:31

## 2022-12-29 RX ADMIN — ENOXAPARIN SODIUM 40 MG: 100 INJECTION SUBCUTANEOUS at 08:31

## 2022-12-29 RX ADMIN — ATORVASTATIN CALCIUM 80 MG: 40 TABLET, FILM COATED ORAL at 08:29

## 2022-12-29 RX ADMIN — PANTOPRAZOLE SODIUM 40 MG: 40 TABLET, DELAYED RELEASE ORAL at 06:01

## 2022-12-29 RX ADMIN — LISINOPRIL 20 MG: 20 TABLET ORAL at 08:29

## 2022-12-29 NOTE — PROGRESS NOTES
Speech Language Pathology      NAME:  Alvina Dong  :  1955  DATE: 2022  ROOM:  [facility]/8058-82    Patient seen for dysphagia therapy 15 minutes. Family present. Patient and family report improved swallow function today. He is able to repeat strategies that he has started implementing more consistently. He reports left head turn helpful. He was able to identify food items that he received today that were in appropriate for him and requested alternate items (knew the pancakes would be too thick). Decrease in wet vocal quality. Discussed decreased sensory input and need to increase volitional saliva swallows. Patient with decreased aphasic responses today but still present. Patient will benefit from ongoing speech and swallowing therapy at discharge.      Stroke determined by clinical assessment [facility]) [I63.9]  Cerebrovascular accident (CVA), unspecified mechanism ([facility] [address] [address] [I63.9]    55043  dysphagia tx    Neva Momin MSCCC/SLP  Speech Language Pathologist  [id number]    '

## 2022-12-29 NOTE — CARE COORDINATION
SS NOTE: COVID GARDENIA TEST SUBMITTED TODAY WILL NEED THAT FOR PLACEMENT  [facility]. Arrangements completed for pt to be transferred to [facility] today via pt's dtr's vehicle- pt can leave her after 3PM today. For the transfer to [facility] pt needs the [address] and a signed OSCAR. SW spoke with pt and his dtr today regarding this plan. SS to continue. CARLYLE Lopez.12/29/2022.2:02PM.

## 2022-12-29 NOTE — PROGRESS NOTES
OCCUPATIONAL THERAPY BEDSIDE TREATMENT NOTE   [facility]  [facility]. [place]    Date:2022  Patient Name: Natalya Webb  MRN: [id number]  : 1955  Room: [address] [place] [address]-      Evaluating OT: Georgi Valle OTR/L #[id number]      Referring Provider and Specific Provider Orders/Date:      22   OT eval and treat  Start:  22,   End:  22,   ONE TIME,   Standing Count:  1 Occurrences,   R         Salo Holiday, DO       Placement Recommendation:  Acute Rehab        Diagnosis:   1. Cerebrovascular accident (CVA), unspecified mechanism [facility])         Surgery: None        Pertinent Medical History:       Past Medical History   History reviewed. No pertinent past medical history. Past Surgical History   History reviewed. No pertinent surgical history. Precautions:  Fall Risk, up as tolerated, hx of CVA May 2022, LLE weakness/scissoring gait     MRI Brain W WO Contrast 22:  1. Early subacute infarct along the left posterolateral juan miguel. 2. Cerebral parenchymal volume loss with mild chronic microvascular white   matter ischemic disease. 3. Trace left mastoid effusion. 4. Asymmetric left maxillary mucosal sinus disease.        Assessment of current deficits    [x] Functional mobility            [x]ADLs           [x] Strength                   []Cognition    [x] Functional transfers          [x] IADLs          [x] Safety Awareness   [x]Endurance    [] Fine Coordination              [x] Balance      [] Vision/perception    []Sensation      []Gross Motor Coordination  [] ROM           [] Delirium                   [] Motor Control      OT PLAN OF CARE   OT POC based on physician orders, patient diagnosis and results of clinical assessment     Frequency/Duration 2-5 days/wk for 2 weeks BID PRN      Specific OT Treatment Interventions to include:   * Instruction/training on adapted ADL techniques and AE recommendations to increase functional independence within precautions       * Training on energy conservation strategies, correct breathing pattern and techniques to improve independence/tolerance for self-care routine  * Functional transfer/mobility training/DME recommendations for increased independence, safety, and fall prevention  * Patient/Family education to increase follow through with safety techniques and functional independence  * Recommendation of environmental modifications for increased safety with functional transfers/mobility and ADLs  * Therapeutic exercise to improve motor endurance, ROM, and functional strength for ADLs/functional transfers  * Therapeutic activities to facilitate/challenge dynamic balance, stand tolerance for increased safety and independence with ADLs  * Therapeutic activities to facilitate gross/fine motor skills for increased independence with ADLs  * Neuro-muscular re-education: facilitation of righting/equilibrium reactions, midline orientation, scapular stability/mobility, normalization of muscle tone, and facilitation of volitional active controled movement     Recommended Adaptive Equipment: TBD at rehab       Home Living: Patient lives alone in a ranch home  with Ramp  to enter Walk in shower  , grab bars       Equipment owned: [employer], [employer], Electric wheelchair, and Elevated toilet seat      Prior Level of Function: Independent with ADLs , Independent with IADLs; ambulated independently.       Driving: Yes   Occupation: Working FT as a manager       Pain Level: no c/o pain at this time                Cognition: A&O: 4/4; Follows 3 step directions              Memory: intact              Sequencing: intact              Problem solving: fair               Judgement/safety: fair      [facility]   AM-PAC Daily Activity Inpatient   How much help for putting on and taking off regular lower body clothing?: A Little  How much help for Bathing?: A Lot  How much help for Toileting?: A Little  How much help for putting on and taking off regular upper body clothing?: A Little  How much help for taking care of personal grooming?: A Little  How much help for eating meals?: A Little  AM-PAC Inpatient Daily Activity Raw Score: 17  AM-PAC Inpatient ADL T-Scale Score : 37.26  ADL Inpatient CMS 0-100% Score: 50.11  ADL Inpatient CMS G-Code Modifier : CK                Functional Assessment:     Initial Eval Status  Date: 12/28/22    Treatment Status  Date: 12/29/22 STGs = LTGs  Time frame: 10-14 days   Feeding Supervision     N/T  Independent    Grooming Minimal Assist   To maintain balance and safety standing at sink for hand hygiene. Min A for balance as pt stood sinkside to wash hands after toileting Moderate Millard    UB Dressing Minimal Assist    Pt required min A to untie back of gown; pt able to don tshirt and regular shirt when seated EOB Moderate Millard    LB Dressing Minimal Assist   To maintain balance support and safety while doffing/donning pants over hips standing at commode. SBA to doff/don B socks seated at EOB. Min A to assist in upright sitting position at EOB to don pants d/t slight L lateral lean/LOB; min A for standing balance as pt donned pants over B hips; pt able to complete clothing management when toileting with min A for balance; pt able to buckle belt while standing as well Moderate Millard    Bathing Moderate Assist   Requires assist for safety and functional transfers. N/T Moderate Millard    Toileting Minimal Assist   For transfer on/off commode using grab bar. SBA for rear hygiene while seated on commode.      Min A with FWW with cuing for hand placement/safety; pt able to complete hygiene when seated on commode ; min A for standing balance during clothing management Moderate Millard    Bed Mobility  Supine to sit: Supervision   Sit to supine:  Not Assessed; at EOB    Pt seated EOB at beginning and end of session  Supine to sit: Independent   Sit to supine: Independent    Functional Transfers Sit to stand: Minimal Assist   Stand to sit: Minimal Assist   Commode Transfer: Minimal Assist      Transfer training with verbal cues for hand placement throughout session to improve safety. Min A for sit to stand transfers to/from EOB and to/from low commode; cuing for hand placement, as well as foot placement and walker safety; unsteadiness noted with min A to correct Independent   Functional Mobility Minimal Assist with wheeled walker to improve balance to/from bathroom, verbal cues for walker sequence and safety. Min A with FWW for short household distances in room to/from bathroom; pt demo'd LOB x 2 reps with cuing for safety, sequencing and to lock out L knee; min A to correct LOB  Moderate Barren with use of wheeled walker    Balance Sitting:     Static: fair plus    Dynamic: fair plus  Standing: fair/fair minus using wheeled walker with occasional episodes of loss of balance, especially while turning or reaching outside base of support. Min/Mod A to correct balance and prevent fall. Sitting:     Static: fair     Dynamic: fair /fair minus (L lateral and posterior lean with LE dressing)  Standing: fair/fair minus using wheeled walker with 2 loss of balances, especially while turning or reaching outside base of support. Min A to correct balance and prevent fall.    Sitting:     Static: good    Dynamic: good  Standing: good with walker    Activity Tolerance fair  plus/good; pt is motivated  Fair plus/good; pt is motivated  Increase standing tolerance >5 minutes for improved engagement with functional transfers and indep in ADLs      Visual/  Perceptual Glasses: Yes      Reports changes in vision since admission: No ; visual screen intact       NA       Hand Dominance: Right        AROM (PROM) Strength Additional Info:  Goal:   RUE  WFL 4-/5 good  and wfl FMC/dexterity noted during ADL tasks    Improve overall RUE strength  for above-mentioned ADLs; training on proper hand placement, safety technique, sequencing, UE/LE dressing and energy conservation techniques. Postural Balance: Sitting/standing balance retraining to improve righting reactions with postural changes during ADLs. Pt has made fair progress towards set goals    L LE weakness noted   OT 1-3 days/wk for 2 weeks PRN     Treatment Time also includes thorough review of current medical information, gathering information on past medical history/social history and prior level of function, informal observation of tasks, assessment of data and education on plan of care and goals.     Treatment Time In: 3:27 PM     Treatment Time Out: 3:44 PM           Treatment Charges: Mins Units   ADL/Home Mgt     40947 15 1   Thera Activities     20292 2 0   Ther Ex                 10724       Manual Therapy    91120     Neuro Re-ed         65162     Orthotic manage/training                               93402     Non Billable Time     Total Timed Treatment 17 [address] [facility], [place]/ #[id number]

## 2022-12-29 NOTE — PROGRESS NOTES
Physical Therapy  Physical Therapy Treatment Note/Plan of Care    Room #:  5465/4742-36  Patient Name: Ella Jose  YOB: 1955  MRN: [id number]    Date of Service: 12/29/2022     Tentative placement recommendation: Acute rehab  Equipment recommendation: To be determined      Evaluating Physical Therapist: Lizeth Jean PT, DPT #[id number]      Specific Provider Orders/Date/Referring Provider :     12/26/22 2200    PT eval and treat  Start:  12/26/22 2200,   End:  12/26/22 2200,   ONE TIME,   Standing Count:  1 Occurrences,   R         Carly Steve, DO Acknowledge New     Admitting Diagnosis:   Stroke determined by clinical assessment [facility]) [I63.9]  Cerebrovascular accident (CVA), unspecified mechanism ([facility] [address] [I63.9]      Surgery: none  Visit Diagnoses         Codes    Cerebrovascular accident (CVA), unspecified mechanism ([facility] [address]    -  Primary I63.9            Patient Active Problem List   Diagnosis    Stroke determined by clinical assessment ([facility] [address]    Acute CVA (cerebrovascular accident) ([facility] [address]        ASSESSMENT of Current Deficits Patient exhibits decreased strength, balance, and endurance impairing functional mobility, transfers, gait , gait distance, and tolerance to activity. Patient with greatest deficits in L LE. With wearing shoes, patient more stable during ambulation, however did have loss of balance with turning, requiring minimal assist for correction. Cues needed throughout for pacing, decreased speed, safety and walker approximation. Patient ambulates with an ataxic, scissoring, and increased left knee/hip flexion for swing phase of gait at times with cueing to correct. Patient aware of deficits and education on re-training muscles. Patient would benefit from further rehab to address deficits and decrease risk of falls.         PHYSICAL THERAPY  PLAN OF CARE       Physical therapy plan of care is established based on physician order,  patient diagnosis and clinical assessment    Current Treatment Recommendations:    -Bed Mobility: Lower extremity exercises  and Trunk control activities   -Sitting Balance: Incorporate reaching activities to activate trunk muscles , Hands on support to maintain midline , Facilitate active trunk muscle engagement , Facilitate postural control in all planes , and Engage in core activities to allow for movement within base of support   -Standing Balance: Perform strengthening exercises in standing to promote motor control with or without upper extremity support , Instruct patient on adequate base of support to maintain balance, and Challenge balance utilizing reaching  activities beyond center of gravity    -Transfers: Provide instruction on proper hand and foot position for adequate transfer of weight onto lower extremities and use of gait device if needed, Cues for hand placement, technique and safety. Provide stabilization to prevent fall , Facilitate weight shift forward on to lower extremities and provide necessary stabilization of bilateral lower extremities , Support transfer of weight on to lower extremities, and Assist with extension of knees trunk and hip to accept weight transfer   -Gait: Gait training, Standing activities to improve: base of support, weight shift, weight bearing , Exercises to improve trunk control, Exercises to improve hip and knee control, Performance of protected weight bearing activities, and Activities to increase weight bearing   -Endurance: Utilize Supervised activities to increase level of endurance to allow for safe functional mobility including transfers and gait  and Use graduated activities to promote good breathing techniques and provide support and education to maximize respiratory function  -Stairs: Stair training with instruction on proper technique and hand placement on rail    PT long term treatment goals are located in below grid    Patient and or family understand(s) diagnosis, prognosis, and plan of care.     Frequency of treatments: Patient will be seen  twice daily. Prior Level of Function: Patient ambulated independently   Rehab Potential: good - for baseline    Past medical history:   History reviewed. No pertinent past medical history. History reviewed. No pertinent surgical history. SUBJECTIVE:    Precautions: Up with assistance, falls and balance deficits, multiple LOB during ambulation      Social history: Patient lives alone in a ranch home  with Ramp  to enter Walk in shower  , grab bars      Equipment owned: Cane, Rollator, Electric wheelchair, and Elevated toilet [address]   How much difficulty turning over in bed?: None  How much difficulty sitting down on / standing up from a chair with arms?: A Little  How much difficulty moving from lying on back to sitting on side of bed?: None  How much help from another person moving to and from a bed to a chair?: A Little  How much help from another person needed to walk in hospital room?: A Little  How much help from another person for climbing 3-5 steps with a railing?: A Lot  AM-PAC Inpatient Mobility Raw Score : 19  AM-PAC Inpatient T-Scale Score : 45.44  Mobility Inpatient CMS 0-100% Score: 41.77  Mobility Inpatient CMS G-Code Modifier : CK    Nursing cleared patient for PT treatment. Patient's daughter present during session. OBJECTIVE;   Initial Evaluation  Date: 12/27/2022 Treatment Date:    12/29/2022   Short Term/ Long Term   Goals   Was pt agreeable to Eval/treatment? Yes yes To be met in 3 days   Pain level   0/10   None reported    Bed Mobility    Rolling: Supervision     Supine to sit: Supervision     Sit to supine: Supervision     Scooting: Supervision    Rolling: Independent   Supine to sit: Independent   Sit to supine: Not assessed patient seated edge of bed   Scooting: Independent    Rolling: Independent    Supine to sit:  Independent    Sit to supine: Independent    Scooting: Independent Transfers Sit to stand: Supervision   Sit to stand: Supervision  cues for hand placement   Sit to stand: Independent    Ambulation     2 x 40 feet using  WW and HHA with Moderate assist of 1   for walker control, walker approximation, balance, upright, weight shift, multiplane instability, and safety 20 feet to the restroom; 2 x 90 feet using  wheeled walker with Minimal assist of 1   cues for upright posture, walker approximation, increased base of support, increased step length, decreased speed, safety, pacing, and with increased left knee/hip flexion to clear left foot at times as well as scissoring gait.      > 150 feet using  least restrictive device with Supervision     Stair negotiation: ascended and descended   Not assessed       10 steps, 1 rail with Supervision   ROM Within functional limits    Increase range of motion 10% of affected joints    Strength BUE:  refer to OT eval  RLE:  4+/5  LLE:  4+/5  Increase strength in affected mm groups by 1/3 grade   Balance Sitting EOB:  good-  Dynamic Standing:  fair - Sitting EOB: good   Dynamic Standing: fair with wheeled walker   Sitting EOB:  good   Dynamic Standing: fair with LRD     Patient is Alert & Oriented x person, place, time, and situation and follows directions    Sensation:  Patient  denies numbness/tingling   Edema:  no   Endurance: fair      Vitals: room air   Blood Pressure at rest  Blood Pressure during session    Heart Rate at rest  Heart Rate during session    SPO2 at rest %  SPO2 during session %     Patient education  Patient educated on role of Physical Therapy, risks of immobility, safety and plan of care, energy conservation,  importance of mobility while in hospital , safety , stair training , and seated and standing exercises      Patient response to education:   Pt verbalized understanding Pt demonstrated skill Pt requires further education in this area   Yes Partial Yes      Treatment:  Patient practiced and was instructed/facilitated in the following treatment: Patient transferred to edge of bed and Sat edge of bed 10 minutes with Supervision  to increase dynamic sitting balance and activity tolerance. Patient performed seated exercises. Pt  stood to amb to the restroom and back to bedside. patient able to cecy his shoes and amb in hallway with inc stability, however still experienced 1 loss of balance with turning. Patient returned to bedside. Therapeutic Exercises:  ankle pumps, hip abduction/adduction, long arc quad, and seated marching,  x 20 reps. AROM     At end of session, patient sitting edge of bed with daughter present call light and phone within reach,  all lines and tubes intact, nursing notified. Patient would benefit from continued skilled Physical Therapy to improve functional independence and quality of life. Patient's/ family goals   rehab    Time in 09:40  Time out  10:05    Total Treatment Time  25 minutes    CPT codes:  Therapeutic activities (78752)   15 minutes  1 unit(s)  Therapeutic exercises (29316)   10 minutes  1 unit(s)    Alber Mai  [facility]  LIC # [id number]

## 2023-02-22 ENCOUNTER — HOSPITAL ENCOUNTER (OUTPATIENT)
Dept: GENERAL RADIOLOGY | Age: 68
Discharge: HOME | End: 2023-02-24
Payer: MEDICARE

## 2023-02-22 DIAGNOSIS — R13.10 DYSPHAGIA, UNSPECIFIED TYPE: ICD-10-CM

## 2023-02-22 PROCEDURE — 6370000000 HC RX 637 (ALT 250 FOR IP): Performed by: NURSE PRACTITIONER

## 2023-02-22 PROCEDURE — 2500000003 HC RX 250 WO HCPCS: Performed by: NURSE PRACTITIONER

## 2023-02-22 PROCEDURE — 74220 X-RAY XM ESOPHAGUS 1CNTRST: CPT

## 2023-02-22 RX ADMIN — ANTACID/ANTIFLATULENT 1 EACH: 380; 550; 10; 10 GRANULE, EFFERVESCENT ORAL at 09:27

## 2023-02-22 RX ADMIN — BARIUM SULFATE 176 ML: 980 POWDER, FOR SUSPENSION ORAL at 09:26
